# Patient Record
Sex: MALE | Race: OTHER | Employment: OTHER | URBAN - METROPOLITAN AREA
[De-identification: names, ages, dates, MRNs, and addresses within clinical notes are randomized per-mention and may not be internally consistent; named-entity substitution may affect disease eponyms.]

---

## 2017-01-21 ENCOUNTER — HOSPITAL ENCOUNTER (OUTPATIENT)
Age: 82
Discharge: HOME | End: 2017-01-21
Attending: FAMILY MEDICINE
Payer: MEDICARE

## 2017-01-21 VITALS
HEART RATE: 65 BPM | BODY MASS INDEX: 22.9 KG/M2 | RESPIRATION RATE: 16 BRPM | DIASTOLIC BLOOD PRESSURE: 70 MMHG | SYSTOLIC BLOOD PRESSURE: 154 MMHG | WEIGHT: 160 LBS | HEIGHT: 70 IN | TEMPERATURE: 98 F | OXYGEN SATURATION: 96 %

## 2017-01-21 DIAGNOSIS — R31.9 URINARY TRACT INFECTION WITH HEMATURIA, SITE UNSPECIFIED: Primary | ICD-10-CM

## 2017-01-21 DIAGNOSIS — N39.0 URINARY TRACT INFECTION WITH HEMATURIA, SITE UNSPECIFIED: Primary | ICD-10-CM

## 2017-01-21 LAB
URINE CLARITY: CLEAR
URINE COLOR: YELLOW
URINE GLUCOSE: 100 MG/DL
URINE KETONES: NEGATIVE MG/DL
URINE NITRITE: NEGATIVE
URINE PH: 6
URINE PROTEIN: NEGATIVE MG /DL
URINE SPECIFIC GRAVITY: 1.02
URINE UROBILINOGEN: 0.2 MG/DL

## 2017-01-21 PROCEDURE — 99214 OFFICE O/P EST MOD 30 MIN: CPT

## 2017-01-21 PROCEDURE — 87086 URINE CULTURE/COLONY COUNT: CPT | Performed by: FAMILY MEDICINE

## 2017-01-21 RX ORDER — LEVOFLOXACIN 750 MG/1
750 TABLET ORAL DAILY
Qty: 7 TABLET | Refills: 0 | Status: SHIPPED | OUTPATIENT
Start: 2017-01-21 | End: 2017-01-28

## 2017-01-21 NOTE — ED PROVIDER NOTES
Patient Seen in: [facility] In [address] [place] [address]    History   Patient presents with:  Abdominal Pain    Stated Complaint: urination problem    HPI    Patient with reported history of prostatitis and bladder cancer with surgeries done in [facility] Tartrate 25 MG Oral Tab,  Take 25 mg by mouth. Omeprazole 40 MG Oral Capsule Delayed Release,  Take 40 mg by mouth.   sucralfate 1 G Oral Tab,  Take 1 g by mouth. Warfarin Sodium 2.5 MG Oral Tab,  Take 2.5 mg by mouth. No family history on file. following:     Glucose, Urine 100  (*)     Bilirubin, Urine Small (*)     Blood, Urine Moderate (*)     Leukocyte esterase urine Trace (*)     All other components within normal limits       MDM   Extensive conversation had with the patient and his sons wh

## 2017-02-21 ENCOUNTER — OFFICE VISIT (OUTPATIENT)
Dept: SURGERY | Facility: CLINIC | Age: 82
End: 2017-02-21

## 2017-02-21 VITALS
TEMPERATURE: 98 F | HEIGHT: 70 IN | DIASTOLIC BLOOD PRESSURE: 78 MMHG | WEIGHT: 190 LBS | BODY MASS INDEX: 27.2 KG/M2 | SYSTOLIC BLOOD PRESSURE: 163 MMHG | HEART RATE: 59 BPM | RESPIRATION RATE: 16 BRPM

## 2017-02-21 DIAGNOSIS — C67.9 MALIGNANT NEOPLASM OF URINARY BLADDER, UNSPECIFIED SITE (HCC): ICD-10-CM

## 2017-02-21 DIAGNOSIS — N35.011 POST-TRAUMATIC BULBOUS URETHRAL STRICTURE: Primary | ICD-10-CM

## 2017-02-21 DIAGNOSIS — R97.20 ELEVATED PSA: ICD-10-CM

## 2017-02-21 PROCEDURE — 99214 OFFICE O/P EST MOD 30 MIN: CPT | Performed by: UROLOGY

## 2017-02-21 PROCEDURE — G0463 HOSPITAL OUTPT CLINIC VISIT: HCPCS | Performed by: UROLOGY

## 2017-02-21 NOTE — PROGRESS NOTES
Verneta Paget is a 80year old male. HPI:   Patient presents with:  Bladder Cancer: Follow up  Urinary Symptoms (urologic): Painful urination    80year-old male accompanied by his son in follow-up to a previous visit September 23, 2016.   The pa Smoking Status: Former Smoker                   Packs/Day: 0.00  Years:         Alcohol Use: No                 Medications (Active prior to today's visit):    Current Outpatient Prescriptions:  ClonazePAM 0.5 MG Oral Tab Take 1 tablet by mouth nightly as family members that he will need to have preoperative medical clearance. Once obtained he will call us in which case he will be scheduled for cystoscopy under anesthesia incision of bulbar urethral stricture, possible TURBT.   He will need a CBC, BMP, urin

## 2017-03-01 ENCOUNTER — TELEPHONE (OUTPATIENT)
Dept: SURGERY | Facility: CLINIC | Age: 82
End: 2017-03-01

## 2017-03-02 ENCOUNTER — TELEPHONE (OUTPATIENT)
Dept: SURGERY | Facility: CLINIC | Age: 82
End: 2017-03-02

## 2017-03-03 NOTE — TELEPHONE ENCOUNTER
I need to see his clearance note first before scheduling any surgery. I don't see that anything has been scanned in as of yet.   Thanks

## 2017-03-07 NOTE — TELEPHONE ENCOUNTER
Spoke with patient' daughter Rhonda stated there ready to proceed. Informed once you have reviewed chart, I will then be able to schedule procedure. Agreed waiting for call back.     Rambo Revel I will place surgery request form on your desk with patient' chart thanks, Juan Diego Mcgarry

## 2017-03-07 NOTE — TELEPHONE ENCOUNTER
Reviewed notes from [facility]. The patient has been cleared for surgery. He will need to stop his aspirin for 7 days preop and his Coumadin for 4 and will need to contact the primary care physician Wade Gauthier NP for Lovenox prescription. I left the paperwork for Heather Bobby.

## 2017-03-15 ENCOUNTER — HOSPITAL ENCOUNTER (OUTPATIENT)
Facility: HOSPITAL | Age: 82
Setting detail: HOSPITAL OUTPATIENT SURGERY
Discharge: HOME | End: 2017-03-15
Attending: UROLOGY | Admitting: UROLOGY
Payer: MEDICARE

## 2017-03-15 ENCOUNTER — SURGERY (OUTPATIENT)
Age: 82
End: 2017-03-15

## 2017-03-15 ENCOUNTER — ANESTHESIA (OUTPATIENT)
Dept: SURGERY | Facility: HOSPITAL | Age: 82
End: 2017-03-15
Payer: MEDICARE

## 2017-03-15 ENCOUNTER — TELEPHONE (OUTPATIENT)
Dept: SURGERY | Facility: CLINIC | Age: 82
End: 2017-03-15

## 2017-03-15 ENCOUNTER — ANESTHESIA EVENT (OUTPATIENT)
Dept: SURGERY | Facility: HOSPITAL | Age: 82
End: 2017-03-15
Payer: MEDICARE

## 2017-03-15 VITALS
WEIGHT: 194.31 LBS | RESPIRATION RATE: 16 BRPM | BODY MASS INDEX: 26.32 KG/M2 | SYSTOLIC BLOOD PRESSURE: 123 MMHG | TEMPERATURE: 99 F | DIASTOLIC BLOOD PRESSURE: 59 MMHG | HEART RATE: 80 BPM | HEIGHT: 72 IN | OXYGEN SATURATION: 93 %

## 2017-03-15 DIAGNOSIS — N35.919: Primary | ICD-10-CM

## 2017-03-15 LAB
GLUCOSE BLDC GLUCOMTR-MCNC: 104 MG/DL (ref 70–99)
GLUCOSE BLDC GLUCOMTR-MCNC: 109 MG/DL (ref 70–99)
INR BLD: 1.3 (ref 0.9–1.2)
PROTHROMBIN TIME: 15.5 SECONDS (ref 11.8–14.5)

## 2017-03-15 PROCEDURE — 52276 CYSTOSCOPY AND TREATMENT: CPT | Performed by: UROLOGY

## 2017-03-15 PROCEDURE — 0TND8ZZ RELEASE URETHRA, VIA NATURAL OR ARTIFICIAL OPENING ENDOSCOPIC: ICD-10-PCS | Performed by: UROLOGY

## 2017-03-15 PROCEDURE — 52224 CYSTOSCOPY AND TREATMENT: CPT | Performed by: UROLOGY

## 2017-03-15 PROCEDURE — 0TBD8ZX EXCISION OF URETHRA, VIA NATURAL OR ARTIFICIAL OPENING ENDOSCOPIC, DIAGNOSTIC: ICD-10-PCS | Performed by: UROLOGY

## 2017-03-15 RX ORDER — LIDOCAINE HYDROCHLORIDE 20 MG/ML
JELLY TOPICAL AS NEEDED
Status: DISCONTINUED | OUTPATIENT
Start: 2017-03-15 | End: 2017-03-15 | Stop reason: HOSPADM

## 2017-03-15 RX ORDER — ONDANSETRON 2 MG/ML
INJECTION INTRAMUSCULAR; INTRAVENOUS AS NEEDED
Status: DISCONTINUED | OUTPATIENT
Start: 2017-03-15 | End: 2017-03-15 | Stop reason: SURG

## 2017-03-15 RX ORDER — PHENAZOPYRIDINE HYDROCHLORIDE 200 MG/1
200 TABLET, FILM COATED ORAL 3 TIMES DAILY PRN
Qty: 10 TABLET | Refills: 0 | Status: SHIPPED | OUTPATIENT
Start: 2017-03-15 | End: 2019-07-12

## 2017-03-15 RX ORDER — MORPHINE SULFATE 2 MG/ML
2 INJECTION, SOLUTION INTRAMUSCULAR; INTRAVENOUS EVERY 10 MIN PRN
Status: DISCONTINUED | OUTPATIENT
Start: 2017-03-15 | End: 2017-03-15

## 2017-03-15 RX ORDER — MORPHINE SULFATE 4 MG/ML
4 INJECTION, SOLUTION INTRAMUSCULAR; INTRAVENOUS EVERY 10 MIN PRN
Status: DISCONTINUED | OUTPATIENT
Start: 2017-03-15 | End: 2017-03-15

## 2017-03-15 RX ORDER — HYDROMORPHONE HYDROCHLORIDE 1 MG/ML
0.2 INJECTION, SOLUTION INTRAMUSCULAR; INTRAVENOUS; SUBCUTANEOUS EVERY 5 MIN PRN
Status: DISCONTINUED | OUTPATIENT
Start: 2017-03-15 | End: 2017-03-15

## 2017-03-15 RX ORDER — MORPHINE SULFATE 10 MG/ML
6 INJECTION, SOLUTION INTRAMUSCULAR; INTRAVENOUS EVERY 10 MIN PRN
Status: DISCONTINUED | OUTPATIENT
Start: 2017-03-15 | End: 2017-03-15

## 2017-03-15 RX ORDER — ONDANSETRON 2 MG/ML
4 INJECTION INTRAMUSCULAR; INTRAVENOUS ONCE AS NEEDED
Status: DISCONTINUED | OUTPATIENT
Start: 2017-03-15 | End: 2017-03-15

## 2017-03-15 RX ORDER — SODIUM CHLORIDE, SODIUM LACTATE, POTASSIUM CHLORIDE, CALCIUM CHLORIDE 600; 310; 30; 20 MG/100ML; MG/100ML; MG/100ML; MG/100ML
INJECTION, SOLUTION INTRAVENOUS CONTINUOUS PRN
Status: DISCONTINUED | OUTPATIENT
Start: 2017-03-15 | End: 2017-03-15 | Stop reason: SURG

## 2017-03-15 RX ORDER — NALOXONE HYDROCHLORIDE 0.4 MG/ML
80 INJECTION, SOLUTION INTRAMUSCULAR; INTRAVENOUS; SUBCUTANEOUS AS NEEDED
Status: DISCONTINUED | OUTPATIENT
Start: 2017-03-15 | End: 2017-03-15

## 2017-03-15 RX ORDER — HYDROCODONE BITARTRATE AND ACETAMINOPHEN 5; 325 MG/1; MG/1
2 TABLET ORAL AS NEEDED
Status: DISCONTINUED | OUTPATIENT
Start: 2017-03-15 | End: 2017-03-15

## 2017-03-15 RX ORDER — PHENYLEPHRINE HCL 10 MG/ML
VIAL (ML) INJECTION AS NEEDED
Status: DISCONTINUED | OUTPATIENT
Start: 2017-03-15 | End: 2017-03-15 | Stop reason: SURG

## 2017-03-15 RX ORDER — HYDROCODONE BITARTRATE AND ACETAMINOPHEN 5; 325 MG/1; MG/1
1 TABLET ORAL AS NEEDED
Status: DISCONTINUED | OUTPATIENT
Start: 2017-03-15 | End: 2017-03-15

## 2017-03-15 RX ORDER — SODIUM CHLORIDE, SODIUM LACTATE, POTASSIUM CHLORIDE, CALCIUM CHLORIDE 600; 310; 30; 20 MG/100ML; MG/100ML; MG/100ML; MG/100ML
INJECTION, SOLUTION INTRAVENOUS CONTINUOUS
Status: DISCONTINUED | OUTPATIENT
Start: 2017-03-15 | End: 2017-03-15

## 2017-03-15 RX ORDER — LIDOCAINE HYDROCHLORIDE 10 MG/ML
INJECTION, SOLUTION EPIDURAL; INFILTRATION; INTRACAUDAL; PERINEURAL AS NEEDED
Status: DISCONTINUED | OUTPATIENT
Start: 2017-03-15 | End: 2017-03-15 | Stop reason: SURG

## 2017-03-15 RX ORDER — ACETAMINOPHEN 325 MG/1
650 TABLET ORAL ONCE
Status: COMPLETED | OUTPATIENT
Start: 2017-03-15 | End: 2017-03-15

## 2017-03-15 RX ORDER — ENOXAPARIN SODIUM 150 MG/ML
INJECTION SUBCUTANEOUS EVERY 12 HOURS
COMMUNITY
End: 2017-09-19 | Stop reason: ALTCHOICE

## 2017-03-15 RX ORDER — HYDROMORPHONE HYDROCHLORIDE 1 MG/ML
0.4 INJECTION, SOLUTION INTRAMUSCULAR; INTRAVENOUS; SUBCUTANEOUS EVERY 5 MIN PRN
Status: DISCONTINUED | OUTPATIENT
Start: 2017-03-15 | End: 2017-03-15

## 2017-03-15 RX ORDER — HYDROMORPHONE HYDROCHLORIDE 1 MG/ML
0.6 INJECTION, SOLUTION INTRAMUSCULAR; INTRAVENOUS; SUBCUTANEOUS EVERY 5 MIN PRN
Status: DISCONTINUED | OUTPATIENT
Start: 2017-03-15 | End: 2017-03-15

## 2017-03-15 RX ORDER — PHENAZOPYRIDINE HYDROCHLORIDE 200 MG/1
200 TABLET, FILM COATED ORAL ONCE
Status: COMPLETED | OUTPATIENT
Start: 2017-03-15 | End: 2017-03-15

## 2017-03-15 RX ORDER — FAMOTIDINE 20 MG/1
20 TABLET ORAL ONCE
Status: DISCONTINUED | OUTPATIENT
Start: 2017-03-15 | End: 2017-03-15 | Stop reason: HOSPADM

## 2017-03-15 RX ORDER — HALOPERIDOL 5 MG/ML
0.25 INJECTION INTRAMUSCULAR ONCE AS NEEDED
Status: DISCONTINUED | OUTPATIENT
Start: 2017-03-15 | End: 2017-03-15

## 2017-03-15 RX ORDER — EPHEDRINE SULFATE 50 MG/ML
INJECTION, SOLUTION INTRAVENOUS AS NEEDED
Status: DISCONTINUED | OUTPATIENT
Start: 2017-03-15 | End: 2017-03-15 | Stop reason: SURG

## 2017-03-15 RX ORDER — LEVOFLOXACIN 5 MG/ML
500 INJECTION, SOLUTION INTRAVENOUS ONCE
Status: COMPLETED | OUTPATIENT
Start: 2017-03-15 | End: 2017-03-15

## 2017-03-15 RX ORDER — CEFADROXIL 500 MG/1
500 CAPSULE ORAL 2 TIMES DAILY
Qty: 6 CAPSULE | Refills: 0 | Status: SHIPPED | OUTPATIENT
Start: 2017-03-16 | End: 2017-03-19

## 2017-03-15 RX ADMIN — ONDANSETRON 4 MG: 2 INJECTION INTRAMUSCULAR; INTRAVENOUS at 11:07:00

## 2017-03-15 RX ADMIN — SODIUM CHLORIDE, SODIUM LACTATE, POTASSIUM CHLORIDE, CALCIUM CHLORIDE: 600; 310; 30; 20 INJECTION, SOLUTION INTRAVENOUS at 11:07:00

## 2017-03-15 RX ADMIN — EPHEDRINE SULFATE 5 MG: 50 INJECTION, SOLUTION INTRAVENOUS at 10:49:00

## 2017-03-15 RX ADMIN — LIDOCAINE HYDROCHLORIDE 50 MG: 10 INJECTION, SOLUTION EPIDURAL; INFILTRATION; INTRACAUDAL; PERINEURAL at 10:35:00

## 2017-03-15 RX ADMIN — SODIUM CHLORIDE, SODIUM LACTATE, POTASSIUM CHLORIDE, CALCIUM CHLORIDE: 600; 310; 30; 20 INJECTION, SOLUTION INTRAVENOUS at 10:29:00

## 2017-03-15 RX ADMIN — PHENYLEPHRINE HCL 100 MCG: 10 MG/ML VIAL (ML) INJECTION at 11:02:00

## 2017-03-15 NOTE — OR NURSING
Per patient and family request patient sent home with large bag active. Leg bag sent home with patient and son Tutu Guillermo. Both verbalized understanding of luther bag changes.

## 2017-03-15 NOTE — TELEPHONE ENCOUNTER
Staff this patient needs to see me in 1 week for Wylie catheter removal.  Please call him Thursday or Friday this week to schedule.

## 2017-03-15 NOTE — H&P
80-year-old male accompanied by his son in follow-up to a previous visit September 23, 2016.  The patient has a previous urologic history of bladder cancer diagnosed while he was in [facility] and treated with cystoscopy and resection in 2012 according to the (Active prior to today's visit):    Current Outpatient Prescriptions:  ClonazePAM 0.5 MG Oral Tab  Take 1 tablet by mouth nightly as needed.  Disp:   Rfl: 3    gabapentin 300 MG Oral Cap  Take 1 capsule by mouth 2 (two) times daily.  Disp:   Rfl: 3    Insu to the patient and his family members that he will need to have preoperative medical clearance.  Once obtained he will call us in which case he will be scheduled for cystoscopy under anesthesia incision of bulbar urethral stricture, possible TURBT.  He faiza

## 2017-03-15 NOTE — OPERATIVE REPORT
[facility]OSP - [facility]    Operative Note     Vesta Low Location: OR   CSN [id number] MRN [id number]   Admission Date 3/15/2017 Operation Date 3/15/2017   Attending Physician Irene Smith MD Operating Physician Darleen Kanner, MD      Preop able to place the urethrotome set into the bladder. Prior to incising the stricture I had placed a 0.38 Bentson wire under fluoroscopy into the urinary bladder. This was placed for safety purposes.   Once the cold knife urethrotome was placed and dilated

## 2017-03-15 NOTE — INTERVAL H&P NOTE
Pre-op Diagnosis: Urethral stricture     The above referenced H&P was reviewed by Zenaida Neely MD on 3/15/2017, the patient was examined and no significant changes have occurred in the patient's condition since the H&P was performed.   I discussed with saturnino

## 2017-03-15 NOTE — ANESTHESIA PREPROCEDURE EVALUATION
Anesthesia PreOp Note    HPI:     Dee Thomas is a 80year old male who presents for preoperative consultation requested by:  Regino Giles MD    Date of Surgery: 3/15/2017    Procedure(s):  CYSTOSCOPY  LASER HOLMIUM LITHOTRIPSY  CYSTOSCOPY MEDRANO 25 mg by mouth. Disp:  Rfl:  3/15/2017 at 0700   Omeprazole 40 MG Oral Capsule Delayed Release Take 40 mg by mouth. Disp:  Rfl:  3/15/2017 at 0700   Warfarin Sodium 2.5 MG Oral Tab Take 2.5 mg by mouth.  Disp:  Rfl:  3/10/2017   Insulin Syringe 31G X 5/16\" height is 1.829 m (6') and weight is 88.14 kg (194 lb 5 oz). His oral temperature is 97.7 °F (36.5 °C). His blood pressure is 145/73 and his pulse is 70. His respiration is 16 and oxygen saturation is 94%.     03/14/17  1223 03/15/17  0906   BP:  145/73   P

## 2017-03-15 NOTE — ANESTHESIA POSTPROCEDURE EVALUATION
Patient: Donny Glass    Procedure Summary     Date Anesthesia Start Anesthesia Stop Room / Location    03/15/17 1029 1124 300 [facility] MAIN OR 14 / 300 [facility] MAIN OR       Procedure Diagnosis Surgeon Responsible Provider    CYSTOSCOPY (N/A ); LASER HOLMIUM LITH

## 2017-03-16 NOTE — TELEPHONE ENCOUNTER
I attempted to reach pt at the only # listed and had to Floy Seats. I held an appt for thurs 3/23 at 10:30am. Please confirm when pt calls back and if he has questions please put the call thru to the RN's.

## 2017-03-23 ENCOUNTER — OFFICE VISIT (OUTPATIENT)
Dept: SURGERY | Facility: CLINIC | Age: 82
End: 2017-03-23

## 2017-03-23 VITALS
BODY MASS INDEX: 27.2 KG/M2 | HEIGHT: 70 IN | HEART RATE: 80 BPM | SYSTOLIC BLOOD PRESSURE: 138 MMHG | RESPIRATION RATE: 16 BRPM | DIASTOLIC BLOOD PRESSURE: 75 MMHG | TEMPERATURE: 98 F | WEIGHT: 190 LBS

## 2017-03-23 DIAGNOSIS — N35.011 POST-TRAUMATIC BULBOUS URETHRAL STRICTURE: Primary | ICD-10-CM

## 2017-03-23 DIAGNOSIS — C61 PROSTATE CANCER (HCC): ICD-10-CM

## 2017-03-23 DIAGNOSIS — R97.20 ELEVATED PSA: ICD-10-CM

## 2017-03-23 PROCEDURE — 99214 OFFICE O/P EST MOD 30 MIN: CPT | Performed by: UROLOGY

## 2017-03-23 PROCEDURE — G0463 HOSPITAL OUTPT CLINIC VISIT: HCPCS | Performed by: UROLOGY

## 2017-03-23 RX ORDER — BICALUTAMIDE 50 MG/1
50 TABLET, FILM COATED ORAL NIGHTLY
Qty: 14 TABLET | Refills: 0 | Status: SHIPPED | OUTPATIENT
Start: 2017-03-23 | End: 2019-07-12

## 2017-03-23 NOTE — PROGRESS NOTES
Carie Snare is a 80year old male.     HPI:   Patient presents with:  Bladder Cancer: luther removal today, used client ID # [id number]      80year old male here with his son in followup to a previous cystoscopy, incision of bulbar urethral st 0.3 ML Does not apply Misc U UTD BID Disp:  Rfl: 3   aspirin EC 81 MG Oral Tab EC Take 81 mg by mouth. Disp:  Rfl:    Atorvastatin Calcium 10 MG Oral Tab Take 10 mg by mouth. Disp:  Rfl:    Glucose Blood In Vitro Strip 1 strip.  Disp:  Rfl:    FLUoxetine HC simply palliative. They will followup accordingly. I spent a total of 25 minutes with patient more than half the time in face to face discussion. Orders This Visit:  No orders of the defined types were placed in this encounter.        Meds This Vi

## 2017-03-24 ENCOUNTER — TELEPHONE (OUTPATIENT)
Dept: SURGERY | Facility: CLINIC | Age: 82
End: 2017-03-24

## 2017-03-28 ENCOUNTER — HOSPITAL ENCOUNTER (OUTPATIENT)
Dept: NUCLEAR MEDICINE | Facility: HOSPITAL | Age: 82
Discharge: HOME | End: 2017-03-28
Attending: UROLOGY
Payer: MEDICARE

## 2017-03-28 ENCOUNTER — HOSPITAL ENCOUNTER (OUTPATIENT)
Dept: CT IMAGING | Facility: HOSPITAL | Age: 82
Discharge: HOME | End: 2017-03-28
Attending: UROLOGY
Payer: MEDICARE

## 2017-03-28 DIAGNOSIS — C61 PROSTATE CANCER (HCC): ICD-10-CM

## 2017-03-28 PROCEDURE — 82565 ASSAY OF CREATININE: CPT

## 2017-03-28 PROCEDURE — 74177 CT ABD & PELVIS W/CONTRAST: CPT

## 2017-03-28 PROCEDURE — 78306 BONE IMAGING WHOLE BODY: CPT

## 2017-03-28 NOTE — TELEPHONE ENCOUNTER
Pt had fov already. Unable to locate  fyi or R. O.I. In epic, stating ok to release info to daughter. fov scheduled for 4/06th.

## 2017-04-01 ENCOUNTER — APPOINTMENT (OUTPATIENT)
Dept: RADIATION ONCOLOGY | Facility: HOSPITAL | Age: 82
End: 2017-04-01
Attending: RADIOLOGY
Payer: MEDICARE

## 2017-04-04 NOTE — TELEPHONE ENCOUNTER
Dr. Aminata Pruitt pt [address] [place] [address] 3/23/17 and pt pharmacy requesting a refill on bicalutamide, if you agree please review and sign med, I copied and pasted part of your last note below. ..     I will start him on Bicalutamide 50 mg PO QD (side effects reviewed) in anticipat

## 2017-04-06 ENCOUNTER — OFFICE VISIT (OUTPATIENT)
Dept: SURGERY | Facility: CLINIC | Age: 82
End: 2017-04-06

## 2017-04-06 VITALS
RESPIRATION RATE: 16 BRPM | HEART RATE: 71 BPM | BODY MASS INDEX: 27.09 KG/M2 | HEIGHT: 72 IN | TEMPERATURE: 99 F | DIASTOLIC BLOOD PRESSURE: 79 MMHG | WEIGHT: 200 LBS | SYSTOLIC BLOOD PRESSURE: 146 MMHG

## 2017-04-06 DIAGNOSIS — C61 PROSTATE CANCER (HCC): Primary | ICD-10-CM

## 2017-04-06 DIAGNOSIS — N35.011 POST-TRAUMATIC BULBOUS URETHRAL STRICTURE: ICD-10-CM

## 2017-04-06 DIAGNOSIS — R82.90 URINE FINDING: ICD-10-CM

## 2017-04-06 PROCEDURE — 81003 URINALYSIS AUTO W/O SCOPE: CPT | Performed by: UROLOGY

## 2017-04-06 PROCEDURE — 96372 THER/PROPH/DIAG INJ SC/IM: CPT | Performed by: UROLOGY

## 2017-04-06 PROCEDURE — G0463 HOSPITAL OUTPT CLINIC VISIT: HCPCS | Performed by: UROLOGY

## 2017-04-06 PROCEDURE — 99214 OFFICE O/P EST MOD 30 MIN: CPT | Performed by: UROLOGY

## 2017-04-06 NOTE — PROGRESS NOTES
Chang Huynh is a 80year old male. HPI:   Patient presents with: Follow - Up: Patient states that he is improving. Patient states that he minor burning sensation at end of urination. Patient c/o diarrhea.  Denies bloody stoos, fever, chills, a Family History   Problem Relation Age of Onset   • Hypertension Father    • Diabetes Father    • Hypertension Mother    • Diabetes Mother    • Cancer Mother    • Cancer Sister       Social History:   Smoking Status: Former Smoker                   Packs/ 25 MG Oral Tab Take 25 mg by mouth. Disp:  Rfl:    Omeprazole 40 MG Oral Capsule Delayed Release Take 40 mg by mouth. Disp:  Rfl:    sucralfate 1 G Oral Tab Take 1 g by mouth. Disp:  Rfl:    Warfarin Sodium 2.5 MG Oral Tab Take 2.5 mg by mouth.  Disp:  Rfl:

## 2017-04-11 RX ORDER — BICALUTAMIDE 50 MG/1
TABLET, FILM COATED ORAL
Refills: 0 | OUTPATIENT
Start: 2017-04-11

## 2017-04-14 ENCOUNTER — OFFICE VISIT (OUTPATIENT)
Dept: RADIATION ONCOLOGY | Facility: HOSPITAL | Age: 82
End: 2017-04-14
Attending: RADIOLOGY
Payer: MEDICARE

## 2017-04-14 ENCOUNTER — OFFICE VISIT (OUTPATIENT)
Dept: HEMATOLOGY/ONCOLOGY | Facility: HOSPITAL | Age: 82
End: 2017-04-14
Attending: INTERNAL MEDICINE
Payer: MEDICARE

## 2017-04-14 VITALS
DIASTOLIC BLOOD PRESSURE: 68 MMHG | HEART RATE: 68 BPM | HEIGHT: 72 IN | SYSTOLIC BLOOD PRESSURE: 158 MMHG | TEMPERATURE: 99 F | WEIGHT: 195 LBS | BODY MASS INDEX: 26.41 KG/M2

## 2017-04-14 DIAGNOSIS — C61 MALIGNANT NEOPLASM OF PROSTATE (HCC): Primary | ICD-10-CM

## 2017-04-14 PROCEDURE — 99212 OFFICE O/P EST SF 10 MIN: CPT

## 2017-04-14 PROCEDURE — 99205 OFFICE O/P NEW HI 60 MIN: CPT | Performed by: INTERNAL MEDICINE

## 2017-04-14 PROCEDURE — G0463 HOSPITAL OUTPT CLINIC VISIT: HCPCS | Performed by: INTERNAL MEDICINE

## 2017-04-14 NOTE — PROGRESS NOTES
Safety Plan Of Care:    Safety Problem:  Risk of fall    Related to:    Diminished physical activity  Pt has a Prosthetic L leg below the knee    General Safety Interventions:Provide an escort while in the dept.        Expected Outcomes:  No injury, trauma

## 2017-04-14 NOTE — CONSULTS
[facility]    PATIENT'S NAME: Bibiana Tompkins   RADIATION ONCOLOGIST: Moy Santana MD   PATIENT ACCOUNT #: [de-identified] LOCATION: [address] [place] [address] RECORD #: [id number] YOB: 1935   CONSULTATION DATE: 04/14/2017 possibility for definitive treatment. The patient otherwise is doing reasonably well. He continues to have some urinary frequency, but this has improved ever since his procedure. He denies any blood in his urine or stool.   He has no changes in appetit nondistended, with normoactive bowel sounds and no hepatosplenomegaly. EXTREMITIES:  Without clubbing, cyanosis, or edema. The extremity exam is significant for the fact that the patient is status post a left BKA.   NEUROLOGIC:  Cranial nerves II through there has been proven benefit to giving Taxol-based chemotherapy to these high-risk patients. The patient has seen Dr. Fernando Cosme, who does have some reservations given the patient's age and overall health status.   This determination does not need to be made no which he will go back to see Dr. Kelly Glynn again to talk about the possibility of adjuvant chemotherapy. Thank you very much for allowing me the opportunity to participate in the care of this patient.   If there are questions regarding the radiotherapy, ple

## 2017-04-14 NOTE — PROGRESS NOTES
GI Plan Of Care:    Problem:    Diarrhea    Problems related to:    Radiation therapy    Interventions:  Monitor bowel function  Administer antidiarrheals  Instruct patient/family regarding medications  Encourage fluids  Instruct patient/family on low fat/

## 2017-04-14 NOTE — PROGRESS NOTES
Nursing Consultation Note  Patient: Marybeth Palomares  YOB: 1935  Age: 80year old  Radiation Oncologist: Dr. Obinna Patel  Referring Physician: No ref. provider found  Diagnosis:No diagnosis found.   Consult Date: 4/14/2017       Take 81 mg by mouth. Disp:  Rfl:    Atorvastatin Calcium 10 MG Oral Tab Take 10 mg by mouth. Disp:  Rfl:    Glucose Blood In Vitro Strip 1 strip. Disp:  Rfl:    FLUoxetine HCl 10 MG Oral Cap Take 10 mg by mouth.  Disp:  Rfl:    Insulin Syringe 30G X 5/16\" History    AMPUTATION LOW LEG,CIRCULAR Left     Comment below the knee amputation    CATH PERIPHERAL STENT Right     Comment lower leg         Social History   Marital Status: Single  Spouse Name: N/A    Years of Education: N/A  Number of Children: N/A

## 2017-04-14 NOTE — PROGRESS NOTES
Knowledge Deficit Plan Of Care:    Problem:  Knowledge Deficit    Problems related to:    Radiation therapy    Interventions:  Assess patient knowledge level  Assess knowledge needs  Instruct on the disease process  Instruct on treatment planning  Instruct

## 2017-04-14 NOTE — CONSULTS
[facility]    PATIENT'S NAME: Skinny DE LEON E [facility] PHYSICIAN: Job Forrest MD   PATIENT ACCOUNT #: [de-identified] LOCATION: [address] [place] [address] RECORD #: [id number] YOB: 1935   CONSULTATION DATE: 04/14/2017       [facility] setting of gangrene, hypertension, gastroesophageal reflux disease, hyperlipidemia. SOCIAL HISTORY:  The patient denies any alcohol, tobacco, or illicit drug use. He states he is a never smoker.   He is from [facility] and previously worked various jobs d Oncology today with Dr. Franchesca Salinas. We agree with the plan for androgen deprivation therapy with external beam radiation therapy, continuing androgen deprivation therapy for a minimum of 2 to 3 years.     We also discussed the use of upfront chemothera

## 2017-04-14 NOTE — PROGRESS NOTES
Plan Of Care:    Problem:  Alteration in elimination    Problems related to:    Side effects of radiation therapy    Interventions:  Encourage fluids  Limit PO intake in the evening  Medications as prescribed  Assess AUA score    Expected Outcomes:  Imp

## 2017-04-14 NOTE — PROGRESS NOTES
Primary language:  Japanese  Language line required? yes  Comprehension Ability:  good  Able to read?  yes  Able to write? yes  Communication tools:  Language line  Patient's ability to learn:  good  Readiness to learn:   Motivated  Learning preferences:

## 2017-05-01 ENCOUNTER — APPOINTMENT (OUTPATIENT)
Dept: RADIATION ONCOLOGY | Facility: HOSPITAL | Age: 82
End: 2017-05-01
Attending: RADIOLOGY
Payer: MEDICARE

## 2017-05-02 ENCOUNTER — TELEPHONE (OUTPATIENT)
Dept: SURGERY | Facility: CLINIC | Age: 82
End: 2017-05-02

## 2017-05-02 ENCOUNTER — TELEPHONE (OUTPATIENT)
Dept: RADIATION ONCOLOGY | Facility: HOSPITAL | Age: 82
End: 2017-05-02

## 2017-05-02 NOTE — TELEPHONE ENCOUNTER
[facility] from Dr. Chino Holloway office called. Patient needs appt for  fiducial markers,  Please call [facility]. Thank you.

## 2017-05-02 NOTE — TELEPHONE ENCOUNTER
L/M regarding patient' office visit with Sharla Sierra was scheduled at 9:45, needs to move to 12:20

## 2017-05-03 ENCOUNTER — APPOINTMENT (OUTPATIENT)
Dept: LAB | Facility: HOSPITAL | Age: 82
End: 2017-05-03
Attending: UROLOGY
Payer: MEDICARE

## 2017-05-03 DIAGNOSIS — C61 PROSTATE CANCER (HCC): ICD-10-CM

## 2017-05-03 PROCEDURE — 36415 COLL VENOUS BLD VENIPUNCTURE: CPT

## 2017-05-03 PROCEDURE — 84153 ASSAY OF PSA TOTAL: CPT

## 2017-05-05 ENCOUNTER — OFFICE VISIT (OUTPATIENT)
Dept: SURGERY | Facility: CLINIC | Age: 82
End: 2017-05-05

## 2017-05-05 VITALS
BODY MASS INDEX: 26 KG/M2 | SYSTOLIC BLOOD PRESSURE: 171 MMHG | RESPIRATION RATE: 16 BRPM | TEMPERATURE: 98 F | DIASTOLIC BLOOD PRESSURE: 80 MMHG | HEART RATE: 74 BPM | WEIGHT: 195 LBS

## 2017-05-05 DIAGNOSIS — N35.011 POST-TRAUMATIC BULBOUS URETHRAL STRICTURE: ICD-10-CM

## 2017-05-05 DIAGNOSIS — C61 PROSTATE CANCER (HCC): Primary | ICD-10-CM

## 2017-05-05 PROCEDURE — 99214 OFFICE O/P EST MOD 30 MIN: CPT | Performed by: UROLOGY

## 2017-05-05 PROCEDURE — G0463 HOSPITAL OUTPT CLINIC VISIT: HCPCS | Performed by: UROLOGY

## 2017-05-05 RX ORDER — CEFDINIR 300 MG/1
300 CAPSULE ORAL EVERY 12 HOURS
Qty: 6 CAPSULE | Refills: 0 | Status: SHIPPED | OUTPATIENT
Start: 2017-05-05 | End: 2017-08-18 | Stop reason: ALTCHOICE

## 2017-05-05 RX ORDER — CIPROFLOXACIN 500 MG/1
500 TABLET, FILM COATED ORAL 2 TIMES DAILY
Qty: 6 TABLET | Refills: 0 | Status: SHIPPED | OUTPATIENT
Start: 2017-05-05 | End: 2017-05-19 | Stop reason: ALTCHOICE

## 2017-05-05 NOTE — PROGRESS NOTES
Berniece Spurling is a 80year old male.     HPI:   Patient presents with:  Prostate Cancer    70-year-old male with very high risk prostate cancer [place] 5+5 diagnosed on a cystoscopy and transurethral biopsy of a prostatic urethral mass most recently 50 MG Oral Tab Take 1 tablet (50 mg total) by mouth nightly. Disp: 14 tablet Rfl: 0   Enoxaparin Sodium 150 MG/ML Subcutaneous Solution Inject into the skin every 12 (twelve) hours.  Disp:  Rfl:    Phenazopyridine HCl (PYRIDIUM) 200 MG Oral Tab Take 1 table urethral stricture    Nursing staff scheduled the patient for a transrectal ultrasound and fiducial marker placements in 2 weeks.   He will contact his primary care physician to get clearance about stopping his Coumadin and aspirin at least 5 days prior to

## 2017-05-08 NOTE — TELEPHONE ENCOUNTER
Please see Last office visit on 5/5 for plan. Patient is scheduled for gold marker placement on 6/1.      ASSESSMENT/PLAN:    Assessment  Prostate cancer (hcc)  (primary encounter diagnosis)  Post-traumatic bulbous urethral stricture    Nursing staff schedu

## 2017-05-19 ENCOUNTER — OFFICE VISIT (OUTPATIENT)
Dept: HEMATOLOGY/ONCOLOGY | Facility: HOSPITAL | Age: 82
End: 2017-05-19
Attending: INTERNAL MEDICINE
Payer: MEDICARE

## 2017-05-19 VITALS
TEMPERATURE: 98 F | RESPIRATION RATE: 16 BRPM | SYSTOLIC BLOOD PRESSURE: 158 MMHG | HEIGHT: 72 IN | BODY MASS INDEX: 26.95 KG/M2 | DIASTOLIC BLOOD PRESSURE: 79 MMHG | WEIGHT: 199 LBS | HEART RATE: 81 BPM

## 2017-05-19 DIAGNOSIS — C61 MALIGNANT NEOPLASM OF PROSTATE (HCC): Primary | ICD-10-CM

## 2017-05-19 PROCEDURE — 99214 OFFICE O/P EST MOD 30 MIN: CPT | Performed by: INTERNAL MEDICINE

## 2017-05-19 PROCEDURE — G0463 HOSPITAL OUTPT CLINIC VISIT: HCPCS | Performed by: INTERNAL MEDICINE

## 2017-05-19 NOTE — PROGRESS NOTES
Cancer Center Progress Note    Patient Name: Randy Miller   YOB: 1935   Medical Record Number: [id number]   Attending Physician: Kezia eMad M.D.      Chief Complaint:  Prostate cancer    History of Present Illness:  Cancer history: Comment below the knee amputation    CATH PERIPHERAL STENT Right     Comment lower leg       Family History:  Family History   Problem Relation Age of Onset   • Hypertension Father    • Diabetes Father    • Hypertension Mother    • Diabetes Mother    • 1 ML Does not apply Misc, 1 Syringe., Disp: , Rfl:   •  Insulin NPH, Human,, Isophane, 100 UNIT/ML Subcutaneous Suspension Pen-injector, Inject 3-5 Units into the skin., Disp: , Rfl:   •  Insulin NPH, Human,, Isophane, 100 UNIT/ML Subcutaneous Suspension, insulin-dependent diabetes mellitus, hypertension, hyperlipidemia, being evaluated for clinical localized high-risk/very high-risk prostate cancer.  The patient has Garland 5 + 5 disease, and a PSA of 25.     –Already on androgen deprivation therapy with L

## 2017-05-26 ENCOUNTER — TELEPHONE (OUTPATIENT)
Dept: SURGERY | Facility: CLINIC | Age: 82
End: 2017-05-26

## 2017-05-26 DIAGNOSIS — Z79.01 LONG TERM (CURRENT) USE OF ANTICOAGULANTS: Primary | ICD-10-CM

## 2017-05-26 NOTE — TELEPHONE ENCOUNTER
ASSESSMENT/PLAN:    Assessment  Prostate cancer (hcc)  (primary encounter diagnosis)  Post-traumatic bulbous urethral stricture    Nursing staff scheduled the patient for a transrectal ultrasound and fiducial marker placements in 2 weeks.  He will contact

## 2017-05-26 NOTE — TELEPHONE ENCOUNTER
I received a call from [facility], the nurse at Dr. Fan Morales office at [facility], and she informed that the NP, Stephon Kramer saw the pt yesterday and Dr. Odalys Molina agreed that pt can safely stop the Coumadin and Aspirin for 5 days prior and she will fa

## 2017-05-26 NOTE — TELEPHONE ENCOUNTER
I called the [employer] and spoke with the kenya Herrera and she ran the 2 abx thru the INS and they both went thru and she will fill them both. I called pt's dtr. Whitney Adkins back to inform her of this.

## 2017-05-26 NOTE — TELEPHONE ENCOUNTER
Daughter states the antibiotics pt needs to take before procedure next week are not at pharm - pls send   Also has ques re  INR morning of procedure for 6/1

## 2017-05-30 NOTE — TELEPHONE ENCOUNTER
I spoke with pt's dtr. Mike Farley and informed her that [facility] ROBERTA HURTADO gave the order that pt does need to have the PT/INR drawn the morning of the gold markers placement.  I told her that I pl;aced that order STAT in the system and she should have pt come into the lab f

## 2017-06-01 ENCOUNTER — TELEPHONE (OUTPATIENT)
Dept: SURGERY | Facility: CLINIC | Age: 82
End: 2017-06-01

## 2017-06-01 NOTE — TELEPHONE ENCOUNTER
Patient's daughter had to cancel procedure due to his insurance changing. Insurance changed to [facility] and is out of network. Procedure was scheduled for today 06/01/17. Please advise.  Thank you

## 2017-06-02 NOTE — TELEPHONE ENCOUNTER
Patient should contact insurance to see which MD is in network to complete his care. Routed to NICOLASA.

## 2017-06-07 ENCOUNTER — TELEPHONE (OUTPATIENT)
Dept: RADIATION ONCOLOGY | Facility: HOSPITAL | Age: 82
End: 2017-06-07

## 2017-06-07 NOTE — TELEPHONE ENCOUNTER
Called daughter, Bella Casiano, inquiring about pt's status, and the reason for cancelling the fiducial placement. States pt's insurance was changed to [place], and [address] [place] [address] and Dr Beatriz Andrews are not in network.  States she now has to find new Dr's to treat her father's

## 2017-06-13 ENCOUNTER — TELEPHONE (OUTPATIENT)
Dept: SURGERY | Facility: CLINIC | Age: 82
End: 2017-06-13

## 2017-07-06 ENCOUNTER — TELEPHONE (OUTPATIENT)
Dept: SURGERY | Facility: CLINIC | Age: 82
End: 2017-07-06

## 2017-07-06 NOTE — TELEPHONE ENCOUNTER
Pts daughter states pt ins is now in network, asking if she can reschedule marker placement or if pt needs to come in for OV?

## 2017-07-21 ENCOUNTER — TELEPHONE (OUTPATIENT)
Dept: SURGERY | Facility: CLINIC | Age: 82
End: 2017-07-21

## 2017-07-21 NOTE — TELEPHONE ENCOUNTER
NICOLASA put a call thru from pt's dtr Jaqueline Story who is calling to schd. Gold markers placement. Pt had to cxl that last appt d/t ins purposes and now has medicare and medicaid and wants to reschd.  I gave an new appt for 8/23 at 3:30pm and I asked her if pt took

## 2017-08-18 ENCOUNTER — TELEPHONE (OUTPATIENT)
Dept: SURGERY | Facility: CLINIC | Age: 82
End: 2017-08-18

## 2017-08-18 DIAGNOSIS — C61 PROSTATE CANCER (HCC): Primary | ICD-10-CM

## 2017-08-18 RX ORDER — CEFDINIR 300 MG/1
CAPSULE ORAL
Qty: 3 CAPSULE | Refills: 0 | Status: SHIPPED | OUTPATIENT
Start: 2017-08-18 | End: 2017-11-30

## 2017-08-18 RX ORDER — CIPROFLOXACIN 500 MG/1
TABLET, FILM COATED ORAL
Qty: 3 TABLET | Refills: 0 | Status: SHIPPED | OUTPATIENT
Start: 2017-08-18 | End: 2017-11-30

## 2017-08-18 NOTE — TELEPHONE ENCOUNTER
I spoke with pt's dtrSteff Conley who has a signed ZAHIDA on file as she was calling about the extra ABX that pt needs for his upcoming Glod markers procedure on 8/23.  She states that pt has left 3 Cipro and 3 Cefdinir and asked that I send the remaining 3 tabs/

## 2017-08-18 NOTE — TELEPHONE ENCOUNTER
Returned call of pharmacist and spoke with pharmacist, Regis Murphy. Informed her after speaking with nurse Myla Zaragoza, was informed pt has the remaining qty of each abx at home, so orders are correct.  Per nurse Myla Zaragoza, test was cancelled previously so pt has quantity barreto

## 2017-08-23 ENCOUNTER — APPOINTMENT (OUTPATIENT)
Dept: LAB | Facility: HOSPITAL | Age: 82
End: 2017-08-23
Attending: UROLOGY
Payer: MEDICARE

## 2017-08-23 ENCOUNTER — TELEPHONE (OUTPATIENT)
Dept: SURGERY | Facility: CLINIC | Age: 82
End: 2017-08-23

## 2017-08-23 DIAGNOSIS — O22.30 DVT (DEEP VEIN THROMBOSIS) IN PREGNANCY: ICD-10-CM

## 2017-08-23 DIAGNOSIS — Z79.01 ANTICOAGULANT LONG-TERM USE: Primary | ICD-10-CM

## 2017-08-23 DIAGNOSIS — C61 PROSTATE CANCER (HCC): ICD-10-CM

## 2017-08-23 DIAGNOSIS — C61 MALIGNANT NEOPLASM OF PROSTATE (HCC): ICD-10-CM

## 2017-08-23 DIAGNOSIS — Z79.01 LONG TERM CURRENT USE OF ANTICOAGULANT THERAPY: ICD-10-CM

## 2017-08-23 LAB
INR BLD: 1.4 (ref 0.9–1.2)
PROTHROMBIN TIME: 16.8 SECONDS (ref 11.8–14.5)
PSA SERPL-MCNC: 5.4 NG/ML (ref 0–4)

## 2017-08-23 PROCEDURE — 84402 ASSAY OF FREE TESTOSTERONE: CPT

## 2017-08-23 PROCEDURE — 36415 COLL VENOUS BLD VENIPUNCTURE: CPT

## 2017-08-23 PROCEDURE — 84403 ASSAY OF TOTAL TESTOSTERONE: CPT

## 2017-08-23 PROCEDURE — 84153 ASSAY OF PSA TOTAL: CPT

## 2017-08-23 PROCEDURE — 85610 PROTHROMBIN TIME: CPT

## 2017-08-23 NOTE — TELEPHONE ENCOUNTER
I spoke with pt's dtr and she said that this must be an old msg because pt's procedure was cxld for today d/t his INR results. She was thankful for the call though.

## 2017-08-23 NOTE — TELEPHONE ENCOUNTER
Lab called to report stat results; PT; 16.8, INR; 1.4    Please note patient has procedure for Gold Marker placement scheduled today @ 3:30 pm.      Routed to Dr. Sierra Tovar; also paged Dr. Sierra Tovar with stat results.   Spoke to Dr. Sierra Tovar and relayed stat INR re

## 2017-08-24 ENCOUNTER — APPOINTMENT (OUTPATIENT)
Dept: LAB | Facility: HOSPITAL | Age: 82
End: 2017-08-24
Attending: UROLOGY
Payer: MEDICARE

## 2017-08-24 ENCOUNTER — OFFICE VISIT (OUTPATIENT)
Dept: SURGERY | Facility: CLINIC | Age: 82
End: 2017-08-24

## 2017-08-24 VITALS
HEIGHT: 72 IN | BODY MASS INDEX: 26.95 KG/M2 | WEIGHT: 199 LBS | HEART RATE: 77 BPM | RESPIRATION RATE: 18 BRPM | SYSTOLIC BLOOD PRESSURE: 116 MMHG | DIASTOLIC BLOOD PRESSURE: 50 MMHG | TEMPERATURE: 99 F

## 2017-08-24 DIAGNOSIS — Z79.01 ANTICOAGULANT LONG-TERM USE: ICD-10-CM

## 2017-08-24 DIAGNOSIS — C61 PROSTATE CANCER (HCC): Primary | ICD-10-CM

## 2017-08-24 LAB
INR BLD: 1.3 (ref 0.9–1.2)
PROTHROMBIN TIME: 16.2 SECONDS (ref 11.8–14.5)

## 2017-08-24 PROCEDURE — 76965 ECHO GUIDANCE RADIOTHERAPY: CPT | Performed by: UROLOGY

## 2017-08-24 PROCEDURE — 85610 PROTHROMBIN TIME: CPT

## 2017-08-24 PROCEDURE — 36415 COLL VENOUS BLD VENIPUNCTURE: CPT

## 2017-08-24 PROCEDURE — 99213 OFFICE O/P EST LOW 20 MIN: CPT | Performed by: UROLOGY

## 2017-08-24 PROCEDURE — 55876 PLACE RT DEVICE/MARKER PROS: CPT | Performed by: UROLOGY

## 2017-08-24 RX ORDER — CIPROFLOXACIN 500 MG/1
500 TABLET, FILM COATED ORAL ONCE
Qty: 1 TABLET | Refills: 0 | Status: SHIPPED | OUTPATIENT
Start: 2017-08-24 | End: 2017-08-24

## 2017-08-24 RX ORDER — CEFDINIR 300 MG/1
300 CAPSULE ORAL ONCE
Qty: 1 CAPSULE | Refills: 0 | Status: SHIPPED | OUTPATIENT
Start: 2017-08-24 | End: 2017-08-24

## 2017-08-25 ENCOUNTER — TELEPHONE (OUTPATIENT)
Dept: HEMATOLOGY/ONCOLOGY | Facility: HOSPITAL | Age: 82
End: 2017-08-25

## 2017-08-25 ENCOUNTER — TELEPHONE (OUTPATIENT)
Dept: RADIATION ONCOLOGY | Facility: HOSPITAL | Age: 82
End: 2017-08-25

## 2017-08-25 ENCOUNTER — TELEPHONE (OUTPATIENT)
Dept: SURGERY | Facility: CLINIC | Age: 82
End: 2017-08-25

## 2017-08-25 ENCOUNTER — NURSE ONLY (OUTPATIENT)
Dept: SURGERY | Facility: CLINIC | Age: 82
End: 2017-08-25

## 2017-08-25 DIAGNOSIS — C61 PROSTATE CANCER (HCC): Primary | ICD-10-CM

## 2017-08-25 LAB
TESTOSTERONE, FREE, S: 3.76 NG/DL
TESTOSTERONE, TOTAL, S: 209 NG/DL

## 2017-08-25 PROCEDURE — 96372 THER/PROPH/DIAG INJ SC/IM: CPT | Performed by: UROLOGY

## 2017-08-25 NOTE — TELEPHONE ENCOUNTER
Gali Hernandez called and said her father had his gold markers place last night. She wants to know the next steps. I provided her the number for central scheduling for the MRI. She wants to know does she schedule the test right away or see the doctor again.  Tracey

## 2017-08-25 NOTE — TELEPHONE ENCOUNTER
Spoke with LEXI and he asked my to place this pt on the nurses [facility] for today to give pt a 6 month Trelstar hormone injection. I did this.

## 2017-08-25 NOTE — PROGRESS NOTES
Pt presents for nurse visit for Trelstar injection. See tperfecto. From Rikki Herrera and nurse Shun Menjivar. Pt accompanied by his daughter,Julia. Both were brought into exam room, whereby pt  was properly identified by spelling of last name and   Injection given as

## 2017-08-25 NOTE — PROGRESS NOTES
Glory Schmitt is a 80year old male.     HPI:   Patient presents with:  Prostate Cancer: transrectal ultrasound with gold fiducial markers placement into the prostate, I called the language line and spoke with Brian Jimenez client ID # [id number]    [id number] visit):    Current Outpatient Prescriptions:  Ciprofloxacin HCl 500 MG Oral Tab Take 1 tab by mouth every 12 hrs for 3 days starting in the morning on the day before the procedure.  Disp: 3 tablet Rfl: 0   cefdinir 300 MG Oral Cap Take 1 cap by mouth every Capsule Delayed Release Take 40 mg by mouth. Disp:  Rfl:    sucralfate 1 G Oral Tab Take 1 g by mouth. Disp:  Rfl:    Warfarin Sodium 2.5 MG Oral Tab Take 2.5 mg by mouth.  Disp:  Rfl:        Allergies:  No Known Allergies      ROS:       PHYSICAL EXAM:   D

## 2017-08-25 NOTE — TELEPHONE ENCOUNTER
Noted he did not receive his trelstar injection yesterday. He needs a 6 months Trelstar as he is overdue. Order placed and nursing staff told.   I called his daughter Rhonda and told her and she will bring him in before 1 PM.  Thanks

## 2017-09-01 ENCOUNTER — HOSPITAL ENCOUNTER (OUTPATIENT)
Dept: MRI IMAGING | Facility: HOSPITAL | Age: 82
Discharge: HOME | End: 2017-09-01
Attending: RADIOLOGY
Payer: MEDICARE

## 2017-09-01 ENCOUNTER — HOSPITAL ENCOUNTER (OUTPATIENT)
Dept: GENERAL RADIOLOGY | Facility: HOSPITAL | Age: 82
Discharge: HOME | End: 2017-09-01
Attending: RADIOLOGY
Payer: MEDICARE

## 2017-09-01 ENCOUNTER — APPOINTMENT (OUTPATIENT)
Dept: RADIATION ONCOLOGY | Facility: HOSPITAL | Age: 82
End: 2017-09-01
Attending: RADIOLOGY
Payer: MEDICARE

## 2017-09-01 DIAGNOSIS — C61 PROSTATE CANCER (HCC): ICD-10-CM

## 2017-09-01 PROCEDURE — 72195 MRI PELVIS W/O DYE: CPT | Performed by: RADIOLOGY

## 2017-09-01 PROCEDURE — 70140 X-RAY EXAM OF FACIAL BONES: CPT | Performed by: RADIOLOGY

## 2017-09-01 PROCEDURE — 77334 RADIATION TREATMENT AID(S): CPT | Performed by: RADIOLOGY

## 2017-09-05 PROCEDURE — 77399 UNLISTED PX MED RADJ PHYSICS: CPT | Performed by: RADIOLOGY

## 2017-09-07 PROCEDURE — 77300 RADIATION THERAPY DOSE PLAN: CPT | Performed by: RADIOLOGY

## 2017-09-07 PROCEDURE — 77338 DESIGN MLC DEVICE FOR IMRT: CPT | Performed by: RADIOLOGY

## 2017-09-07 PROCEDURE — 77301 RADIOTHERAPY DOSE PLAN IMRT: CPT | Performed by: RADIOLOGY

## 2017-09-08 PROCEDURE — 77300 RADIATION THERAPY DOSE PLAN: CPT | Performed by: RADIOLOGY

## 2017-09-08 PROCEDURE — 77338 DESIGN MLC DEVICE FOR IMRT: CPT | Performed by: RADIOLOGY

## 2017-09-14 PROCEDURE — 77385 HC IMRT SIMPLE: CPT | Performed by: RADIOLOGY

## 2017-09-15 PROCEDURE — 77386 HC IMRT COMPLEX: CPT | Performed by: RADIOLOGY

## 2017-09-15 PROCEDURE — 77385 HC IMRT SIMPLE: CPT | Performed by: RADIOLOGY

## 2017-09-18 PROCEDURE — 77385 HC IMRT SIMPLE: CPT | Performed by: RADIOLOGY

## 2017-09-19 ENCOUNTER — OFFICE VISIT (OUTPATIENT)
Dept: RADIATION ONCOLOGY | Facility: HOSPITAL | Age: 82
End: 2017-09-19
Attending: RADIOLOGY
Payer: MEDICARE

## 2017-09-19 VITALS
SYSTOLIC BLOOD PRESSURE: 172 MMHG | WEIGHT: 202.63 LBS | RESPIRATION RATE: 18 BRPM | DIASTOLIC BLOOD PRESSURE: 66 MMHG | BODY MASS INDEX: 28.37 KG/M2 | HEIGHT: 71 IN | HEART RATE: 67 BPM

## 2017-09-19 DIAGNOSIS — C61 MALIGNANT NEOPLASM OF PROSTATE (HCC): Primary | ICD-10-CM

## 2017-09-19 PROCEDURE — 77385 HC IMRT SIMPLE: CPT | Performed by: RADIOLOGY

## 2017-09-19 NOTE — PROGRESS NOTES
[facility] Radiation Treatment Management Note 1-5    Patient:  Franny Marr  Age:  80year old  Visit Diagnosis:    1.  Malignant neoplasm of prostate [facility])      Primary Rad/Onc:  Dr. May Portillo    Site Delivered Dose (G

## 2017-09-19 NOTE — PROGRESS NOTES
[facility] Radiation Treatment Management Note 1-5    Patient:  Marybeth Palomares  Age:  80year old  Visit Diagnosis:    1.  Malignant neoplasm of prostate [facility])      Primary Rad/Onc:  Dr. Yeboah Cost    Site Delivered Dose (G

## 2017-09-20 PROCEDURE — 77385 HC IMRT SIMPLE: CPT | Performed by: RADIOLOGY

## 2017-09-21 ENCOUNTER — DIETICIAN VISIT (OUTPATIENT)
Dept: NUTRITION | Facility: HOSPITAL | Age: 82
End: 2017-09-21

## 2017-09-21 VITALS — BODY MASS INDEX: 28 KG/M2 | WEIGHT: 201 LBS

## 2017-09-21 PROCEDURE — 77385 HC IMRT SIMPLE: CPT | Performed by: RADIOLOGY

## 2017-09-21 NOTE — PROGRESS NOTES
Oncology Nutrition Assessment  Due to language barrier--talked with pts daughter and she will interpret information to pt.     Ht Readings from Last 1 Encounters:  09/19/17 : 180.3 cm (5' 11\")      Wt Readings from Last 1 Encounters:  09/21/17 : 91.2 kg (2

## 2017-09-22 PROCEDURE — 77385 HC IMRT SIMPLE: CPT | Performed by: RADIOLOGY

## 2017-09-22 PROCEDURE — 77336 RADIATION PHYSICS CONSULT: CPT | Performed by: RADIOLOGY

## 2017-09-25 PROCEDURE — 77385 HC IMRT SIMPLE: CPT | Performed by: RADIOLOGY

## 2017-09-26 ENCOUNTER — OFFICE VISIT (OUTPATIENT)
Dept: RADIATION ONCOLOGY | Facility: HOSPITAL | Age: 82
End: 2017-09-26
Attending: RADIOLOGY
Payer: MEDICARE

## 2017-09-26 VITALS
SYSTOLIC BLOOD PRESSURE: 151 MMHG | HEIGHT: 71 IN | BODY MASS INDEX: 28.39 KG/M2 | WEIGHT: 202.81 LBS | HEART RATE: 69 BPM | RESPIRATION RATE: 18 BRPM | DIASTOLIC BLOOD PRESSURE: 67 MMHG

## 2017-09-26 DIAGNOSIS — C61 MALIGNANT NEOPLASM OF PROSTATE (HCC): Primary | ICD-10-CM

## 2017-09-26 PROCEDURE — 77385 HC IMRT SIMPLE: CPT | Performed by: RADIOLOGY

## 2017-09-26 NOTE — PROGRESS NOTES
[facility] Radiation Treatment Management Note 6-10    Patient:  Therese Mayorga  Age:  80year old  Visit Diagnosis:    1.  Malignant neoplasm of prostate [facility])      Primary Rad/Onc:  Dr. Roberto Victoria    Site Delivered Dose (

## 2017-09-27 PROCEDURE — 77385 HC IMRT SIMPLE: CPT | Performed by: RADIOLOGY

## 2017-09-28 ENCOUNTER — DIETICIAN VISIT (OUTPATIENT)
Dept: NUTRITION | Facility: HOSPITAL | Age: 82
End: 2017-09-28

## 2017-09-28 VITALS — BODY MASS INDEX: 28 KG/M2 | WEIGHT: 203.81 LBS

## 2017-09-28 PROCEDURE — 77385 HC IMRT SIMPLE: CPT | Performed by: RADIOLOGY

## 2017-09-28 NOTE — PROGRESS NOTES
son interpreted for pt    Ht Readings from Last 1 Encounters:  09/26/17 : 180.3 cm (5' 11\")      Wt Readings from Last 1 Encounters:  09/28/17 : 92.4 kg (203 lb 12.8 oz)    BMI Calculated: Body mass index is 28.42 kg/m². Weight History:   Wt Reading

## 2017-09-29 PROCEDURE — 77385 HC IMRT SIMPLE: CPT | Performed by: RADIOLOGY

## 2017-09-29 PROCEDURE — 77336 RADIATION PHYSICS CONSULT: CPT | Performed by: RADIOLOGY

## 2017-10-01 ENCOUNTER — APPOINTMENT (OUTPATIENT)
Dept: RADIATION ONCOLOGY | Facility: HOSPITAL | Age: 82
End: 2017-10-01
Attending: RADIOLOGY
Payer: MEDICARE

## 2017-10-02 PROCEDURE — 77385 HC IMRT SIMPLE: CPT | Performed by: RADIOLOGY

## 2017-10-03 ENCOUNTER — OFFICE VISIT (OUTPATIENT)
Dept: RADIATION ONCOLOGY | Facility: HOSPITAL | Age: 82
End: 2017-10-03
Attending: RADIOLOGY
Payer: MEDICARE

## 2017-10-03 VITALS
HEIGHT: 71 IN | RESPIRATION RATE: 18 BRPM | HEART RATE: 77 BPM | BODY MASS INDEX: 28.61 KG/M2 | SYSTOLIC BLOOD PRESSURE: 157 MMHG | WEIGHT: 204.38 LBS | DIASTOLIC BLOOD PRESSURE: 80 MMHG

## 2017-10-03 DIAGNOSIS — C61 MALIGNANT NEOPLASM OF PROSTATE (HCC): Primary | ICD-10-CM

## 2017-10-03 PROCEDURE — 77385 HC IMRT SIMPLE: CPT | Performed by: RADIOLOGY

## 2017-10-03 RX ORDER — LOPERAMIDE HYDROCHLORIDE 2 MG/1
2 CAPSULE ORAL 4 TIMES DAILY PRN
COMMUNITY
End: 2019-07-12

## 2017-10-03 NOTE — PROGRESS NOTES
[facility] Radiation Treatment Management Note 11-15    Patient:  Chris Gagnon  Age:  80year old  Visit Diagnosis:    1.  Malignant neoplasm of prostate [facility])      Primary Rad/Onc:  Dr. Didi Gomze    Site Delivered Dose

## 2017-10-04 PROCEDURE — 77385 HC IMRT SIMPLE: CPT | Performed by: RADIOLOGY

## 2017-10-05 ENCOUNTER — DIETICIAN VISIT (OUTPATIENT)
Dept: NUTRITION | Facility: HOSPITAL | Age: 82
End: 2017-10-05

## 2017-10-05 VITALS — WEIGHT: 204.38 LBS | BODY MASS INDEX: 29 KG/M2

## 2017-10-05 PROCEDURE — 77385 HC IMRT SIMPLE: CPT | Performed by: RADIOLOGY

## 2017-10-05 NOTE — PROGRESS NOTES
[address] daughter interpreted for pt and wife    Ht Readings from Last 1 Encounters:  10/03/17 : 180.3 cm (5' 11\")      Wt Readings from Last 1 Encounters:  10/05/17 : 92.7 kg (204 lb 6.4 oz)    BMI Calculated: Body mass index is 28.51 kg/m².   Weight Histor

## 2017-10-06 PROCEDURE — 77385 HC IMRT SIMPLE: CPT | Performed by: RADIOLOGY

## 2017-10-06 PROCEDURE — 77336 RADIATION PHYSICS CONSULT: CPT | Performed by: RADIOLOGY

## 2017-10-09 PROCEDURE — 77385 HC IMRT SIMPLE: CPT | Performed by: RADIOLOGY

## 2017-10-10 ENCOUNTER — OFFICE VISIT (OUTPATIENT)
Dept: RADIATION ONCOLOGY | Facility: HOSPITAL | Age: 82
End: 2017-10-10
Attending: RADIOLOGY
Payer: MEDICARE

## 2017-10-10 VITALS
HEART RATE: 65 BPM | SYSTOLIC BLOOD PRESSURE: 157 MMHG | BODY MASS INDEX: 28.42 KG/M2 | DIASTOLIC BLOOD PRESSURE: 64 MMHG | WEIGHT: 203 LBS | HEIGHT: 71 IN | RESPIRATION RATE: 16 BRPM

## 2017-10-10 DIAGNOSIS — C61 MALIGNANT NEOPLASM OF PROSTATE (HCC): Primary | ICD-10-CM

## 2017-10-10 PROCEDURE — 77385 HC IMRT SIMPLE: CPT | Performed by: RADIOLOGY

## 2017-10-10 NOTE — PROGRESS NOTES
[facility] Radiation Treatment Management Note 16-20    Patient:  Doroteo Edwards  Age:  80year old  Visit Diagnosis:    1.  Malignant neoplasm of prostate [facility])      Primary Rad/Onc:  Dr. Estes Big    Site Delivered Dose

## 2017-10-11 ENCOUNTER — TELEPHONE (OUTPATIENT)
Dept: HEMATOLOGY/ONCOLOGY | Facility: HOSPITAL | Age: 82
End: 2017-10-11

## 2017-10-11 PROCEDURE — 77385 HC IMRT SIMPLE: CPT | Performed by: RADIOLOGY

## 2017-10-11 NOTE — TELEPHONE ENCOUNTER
9/5 @ \[facility]\"", Spoke with Donna [place] the patient daughter. She will call us once she gets radiations time for her father so we can coincide appointments  [email or web address], Left a message for dtr asking to call back and schedule a follow up. [email or web address]. Tyron Kramer

## 2017-10-12 PROCEDURE — 77385 HC IMRT SIMPLE: CPT | Performed by: RADIOLOGY

## 2017-10-13 PROCEDURE — 77336 RADIATION PHYSICS CONSULT: CPT | Performed by: RADIOLOGY

## 2017-10-13 PROCEDURE — 77385 HC IMRT SIMPLE: CPT | Performed by: RADIOLOGY

## 2017-10-16 PROCEDURE — 77385 HC IMRT SIMPLE: CPT | Performed by: RADIOLOGY

## 2017-10-17 ENCOUNTER — OFFICE VISIT (OUTPATIENT)
Dept: RADIATION ONCOLOGY | Facility: HOSPITAL | Age: 82
End: 2017-10-17
Attending: RADIOLOGY
Payer: MEDICARE

## 2017-10-17 VITALS
DIASTOLIC BLOOD PRESSURE: 79 MMHG | BODY MASS INDEX: 28.56 KG/M2 | HEART RATE: 70 BPM | RESPIRATION RATE: 18 BRPM | WEIGHT: 204 LBS | SYSTOLIC BLOOD PRESSURE: 169 MMHG | HEIGHT: 71 IN

## 2017-10-17 DIAGNOSIS — C61 MALIGNANT NEOPLASM OF PROSTATE (HCC): Primary | ICD-10-CM

## 2017-10-17 PROCEDURE — 77385 HC IMRT SIMPLE: CPT | Performed by: RADIOLOGY

## 2017-10-17 NOTE — PROGRESS NOTES
[facility] Radiation Treatment Management Note 21-25    Patient:  Queenie Sahni  Age:  80year old  Visit Diagnosis:    1.  Malignant neoplasm of prostate [facility])      Primary Rad/Onc:  Dr. [facility]    Site Delivered Dose

## 2017-10-18 PROCEDURE — 77385 HC IMRT SIMPLE: CPT | Performed by: RADIOLOGY

## 2017-10-19 ENCOUNTER — DIETICIAN VISIT (OUTPATIENT)
Dept: NUTRITION | Facility: HOSPITAL | Age: 82
End: 2017-10-19

## 2017-10-19 VITALS — WEIGHT: 203.81 LBS | BODY MASS INDEX: 28 KG/M2

## 2017-10-19 PROCEDURE — 77385 HC IMRT SIMPLE: CPT | Performed by: RADIOLOGY

## 2017-10-19 NOTE — PROGRESS NOTES
Grand daughter interpreted for pt and wife    Ht Readings from Last 1 Encounters:  10/17/17 : 180.3 cm (5' 11\")      Wt Readings from Last 1 Encounters:  10/19/17 : 92.4 kg (203 lb 12.8 oz)    BMI Calculated: Body mass index is 28.42 kg/m².   Weight Histo

## 2017-10-20 PROCEDURE — 77385 HC IMRT SIMPLE: CPT | Performed by: RADIOLOGY

## 2017-10-20 PROCEDURE — 77336 RADIATION PHYSICS CONSULT: CPT | Performed by: RADIOLOGY

## 2017-10-23 PROCEDURE — 77385 HC IMRT SIMPLE: CPT | Performed by: RADIOLOGY

## 2017-10-24 ENCOUNTER — OFFICE VISIT (OUTPATIENT)
Dept: RADIATION ONCOLOGY | Facility: HOSPITAL | Age: 82
End: 2017-10-24
Attending: RADIOLOGY
Payer: MEDICARE

## 2017-10-24 VITALS
HEART RATE: 64 BPM | BODY MASS INDEX: 28.56 KG/M2 | RESPIRATION RATE: 18 BRPM | DIASTOLIC BLOOD PRESSURE: 60 MMHG | SYSTOLIC BLOOD PRESSURE: 154 MMHG | WEIGHT: 204 LBS | HEIGHT: 71 IN

## 2017-10-24 DIAGNOSIS — C61 MALIGNANT NEOPLASM OF PROSTATE (HCC): Primary | ICD-10-CM

## 2017-10-24 PROCEDURE — 77385 HC IMRT SIMPLE: CPT | Performed by: RADIOLOGY

## 2017-10-24 NOTE — PROGRESS NOTES
[facility] Radiation Treatment Management Note 26-30    Patient:  Berniece Spurling  Age:  80year old  Visit Diagnosis:    1.  Malignant neoplasm of prostate [facility])      Primary Rad/Onc:  Dr. Winston Wagner    Site Delivered Dose

## 2017-10-25 PROCEDURE — 77385 HC IMRT SIMPLE: CPT | Performed by: RADIOLOGY

## 2017-10-26 ENCOUNTER — DIETICIAN VISIT (OUTPATIENT)
Dept: NUTRITION | Facility: HOSPITAL | Age: 82
End: 2017-10-26

## 2017-10-26 VITALS — BODY MASS INDEX: 29 KG/M2 | WEIGHT: 204.63 LBS

## 2017-10-26 PROCEDURE — 77385 HC IMRT SIMPLE: CPT | Performed by: RADIOLOGY

## 2017-10-26 NOTE — PROGRESS NOTES
[address] daughter interpreted for pt and wife    Ht Readings from Last 1 Encounters:  10/24/17 : 180.3 cm (5' 11\")      Wt Readings from Last 1 Encounters:  10/26/17 : 92.8 kg (204 lb 9.6 oz)    BMI Calculated: Body mass index is 28.54 kg/m².   Weight Histor

## 2017-10-27 PROCEDURE — 77385 HC IMRT SIMPLE: CPT | Performed by: RADIOLOGY

## 2017-10-27 PROCEDURE — 77336 RADIATION PHYSICS CONSULT: CPT | Performed by: RADIOLOGY

## 2017-10-30 PROCEDURE — 77385 HC IMRT SIMPLE: CPT | Performed by: RADIOLOGY

## 2017-10-31 ENCOUNTER — OFFICE VISIT (OUTPATIENT)
Dept: RADIATION ONCOLOGY | Facility: HOSPITAL | Age: 82
End: 2017-10-31
Attending: RADIOLOGY
Payer: MEDICARE

## 2017-10-31 VITALS
HEIGHT: 71 IN | SYSTOLIC BLOOD PRESSURE: 178 MMHG | HEART RATE: 73 BPM | BODY MASS INDEX: 28.65 KG/M2 | RESPIRATION RATE: 18 BRPM | WEIGHT: 204.63 LBS | DIASTOLIC BLOOD PRESSURE: 76 MMHG

## 2017-10-31 DIAGNOSIS — C61 MALIGNANT NEOPLASM OF PROSTATE (HCC): Primary | ICD-10-CM

## 2017-10-31 PROCEDURE — 77385 HC IMRT SIMPLE: CPT | Performed by: RADIOLOGY

## 2017-10-31 NOTE — PROGRESS NOTES
[facility] Radiation Treatment Management Note 31-35    Patient:  Graciela Ling  Age:  80year old  Visit Diagnosis:    1.  Malignant neoplasm of prostate [facility])      Primary Rad/Onc:  Dr. Garcia Done    Site Delivered Dose

## 2017-11-01 ENCOUNTER — APPOINTMENT (OUTPATIENT)
Dept: RADIATION ONCOLOGY | Facility: HOSPITAL | Age: 82
End: 2017-11-01
Attending: RADIOLOGY
Payer: MEDICARE

## 2017-11-01 PROCEDURE — 77385 HC IMRT SIMPLE: CPT | Performed by: RADIOLOGY

## 2017-11-02 ENCOUNTER — DIETICIAN VISIT (OUTPATIENT)
Dept: NUTRITION | Facility: HOSPITAL | Age: 82
End: 2017-11-02

## 2017-11-02 VITALS — BODY MASS INDEX: 28 KG/M2 | WEIGHT: 203.81 LBS

## 2017-11-02 PROCEDURE — 77385 HC IMRT SIMPLE: CPT | Performed by: RADIOLOGY

## 2017-11-02 NOTE — PROGRESS NOTES
[address] daughter interpreted for pt and wife    Ht Readings from Last 1 Encounters:  10/31/17 : 180.3 cm (5' 11\")      Wt Readings from Last 1 Encounters:  11/02/17 : 92.4 kg (203 lb 12.8 oz)    BMI Calculated: Body mass index is 28.42 kg/m².   Weight Histo

## 2017-11-03 PROCEDURE — 77385 HC IMRT SIMPLE: CPT | Performed by: RADIOLOGY

## 2017-11-03 PROCEDURE — 77336 RADIATION PHYSICS CONSULT: CPT | Performed by: RADIOLOGY

## 2017-11-07 ENCOUNTER — APPOINTMENT (OUTPATIENT)
Dept: RADIATION ONCOLOGY | Facility: HOSPITAL | Age: 82
End: 2017-11-07
Attending: RADIOLOGY
Payer: MEDICARE

## 2017-11-08 ENCOUNTER — OFFICE VISIT (OUTPATIENT)
Dept: RADIATION ONCOLOGY | Facility: HOSPITAL | Age: 82
End: 2017-11-08
Attending: RADIOLOGY
Payer: MEDICARE

## 2017-11-08 VITALS
DIASTOLIC BLOOD PRESSURE: 57 MMHG | HEIGHT: 71 IN | HEART RATE: 71 BPM | SYSTOLIC BLOOD PRESSURE: 149 MMHG | BODY MASS INDEX: 28.39 KG/M2 | WEIGHT: 202.81 LBS

## 2017-11-08 DIAGNOSIS — C61 MALIGNANT NEOPLASM OF PROSTATE (HCC): Primary | ICD-10-CM

## 2017-11-08 PROCEDURE — 77385 HC IMRT SIMPLE: CPT | Performed by: RADIOLOGY

## 2017-11-08 RX ORDER — DIPHENOXYLATE HYDROCHLORIDE AND ATROPINE SULFATE 2.5; .025 MG/1; MG/1
1 TABLET ORAL 4 TIMES DAILY PRN
Qty: 60 TABLET | Refills: 1 | Status: SHIPPED | OUTPATIENT
Start: 2017-11-08 | End: 2019-07-12

## 2017-11-08 NOTE — PROGRESS NOTES
[facility] Radiation Treatment Management Note 36-40    Patient:  José Mancia  Age:  80year old  Visit Diagnosis:    1.  Malignant neoplasm of prostate [facility])      Primary Rad/Onc:  Dr. Huber Listen    Site Delivered Dose

## 2017-11-09 ENCOUNTER — DIETICIAN VISIT (OUTPATIENT)
Dept: NUTRITION | Facility: HOSPITAL | Age: 82
End: 2017-11-09

## 2017-11-09 VITALS — BODY MASS INDEX: 28 KG/M2 | WEIGHT: 202.63 LBS

## 2017-11-09 PROCEDURE — 77385 HC IMRT SIMPLE: CPT | Performed by: RADIOLOGY

## 2017-11-09 NOTE — PROGRESS NOTES
[address] daughter interpreted for pt and wife    Ht Readings from Last 1 Encounters:  11/08/17 : 180.3 cm (5' 11\")      Wt Readings from Last 1 Encounters:  11/09/17 : 91.9 kg (202 lb 9.6 oz)    BMI Calculated: Body mass index is 28.26 kg/m².   Weight Histor

## 2017-11-10 PROCEDURE — 77336 RADIATION PHYSICS CONSULT: CPT | Performed by: RADIOLOGY

## 2017-11-10 PROCEDURE — 77385 HC IMRT SIMPLE: CPT | Performed by: RADIOLOGY

## 2017-11-13 PROCEDURE — 77385 HC IMRT SIMPLE: CPT | Performed by: RADIOLOGY

## 2017-11-14 ENCOUNTER — OFFICE VISIT (OUTPATIENT)
Dept: RADIATION ONCOLOGY | Facility: HOSPITAL | Age: 82
End: 2017-11-14
Attending: RADIOLOGY
Payer: MEDICARE

## 2017-11-14 VITALS
RESPIRATION RATE: 16 BRPM | WEIGHT: 201.63 LBS | HEIGHT: 71 IN | BODY MASS INDEX: 28.23 KG/M2 | SYSTOLIC BLOOD PRESSURE: 165 MMHG | HEART RATE: 76 BPM | DIASTOLIC BLOOD PRESSURE: 74 MMHG

## 2017-11-14 DIAGNOSIS — C61 MALIGNANT NEOPLASM OF PROSTATE (HCC): Primary | ICD-10-CM

## 2017-11-14 PROCEDURE — 77385 HC IMRT SIMPLE: CPT | Performed by: RADIOLOGY

## 2017-11-14 NOTE — PROGRESS NOTES
[facility] Radiation Treatment Management Note 41-45    Patient:  Paulina Wilson  Age:  80year old  Visit Diagnosis:    1.  Malignant neoplasm of prostate [facility])      Primary Rad/Onc:  Dr. Marc Hernandez    Site Delivered Dose

## 2017-11-15 PROCEDURE — 77385 HC IMRT SIMPLE: CPT | Performed by: RADIOLOGY

## 2017-11-16 ENCOUNTER — DIETICIAN VISIT (OUTPATIENT)
Dept: NUTRITION | Facility: HOSPITAL | Age: 82
End: 2017-11-16

## 2017-11-16 PROCEDURE — 77385 HC IMRT SIMPLE: CPT | Performed by: RADIOLOGY

## 2017-11-16 NOTE — PROGRESS NOTES
Brief Nutrition Note:    RT complete tomorrow. Talked with family member about diet progression after completion of treatment. Verbalized understanding.       [address], [address] [facility]   Clinical Dietitian [id number]

## 2017-11-17 ENCOUNTER — NURSE ONLY (OUTPATIENT)
Dept: RADIATION ONCOLOGY | Facility: HOSPITAL | Age: 82
End: 2017-11-17

## 2017-11-17 PROCEDURE — 77385 HC IMRT SIMPLE: CPT | Performed by: RADIOLOGY

## 2017-11-17 PROCEDURE — 77336 RADIATION PHYSICS CONSULT: CPT | Performed by: RADIOLOGY

## 2017-11-17 NOTE — PATIENT INSTRUCTIONS
Follow up with Dr Cari Ashley 12/22/17 @ 300pm. Continue to eat well and stay hydrated. Continue with lomotil for the next 2-3 weeks as needed. Make appointments wit Dr Kelly Glynn and Dr Jace Castro for follow up. Please call with any questions or concerns.

## 2017-11-17 NOTE — PROGRESS NOTES
Pt completed radiation to the prostate this afternoon. Tolerated well overall. Encouraged to make follow up appointments with Terrence Gutierrez and Akhil. Follow up with DR Kel Howard in 1 month. No c/o diarrhea, urianry urgency present. Lomotil prn.  Updated Nena Span

## 2017-11-21 NOTE — PROGRESS NOTES
[facility]    PATIENT'S NAME: Qamar Bazan   RADIATION ONCOLOGIST: Nik Bowen.  Sophy Stewart MD   PATIENT ACCOUNT #: [de-identified] LOCATION: [address] [place] [address] RECORD #: [id number] YOB: 1935   DATE: 11/17/2017       RADIATION received all 45 prescribed radiation treatments. TREATMENT SUMMARY:  Based upon the fact that he had very high risk disease, but no evidence of distant metastases, we opted to treat the patient with local and regional radiation.   To that extent, I treat

## 2017-11-22 PROCEDURE — 77336 RADIATION PHYSICS CONSULT: CPT | Performed by: RADIOLOGY

## 2017-11-28 ENCOUNTER — TELEPHONE (OUTPATIENT)
Dept: HEMATOLOGY/ONCOLOGY | Facility: HOSPITAL | Age: 82
End: 2017-11-28

## 2017-11-30 ENCOUNTER — OFFICE VISIT (OUTPATIENT)
Dept: SURGERY | Facility: CLINIC | Age: 82
End: 2017-11-30

## 2017-11-30 VITALS
RESPIRATION RATE: 16 BRPM | SYSTOLIC BLOOD PRESSURE: 154 MMHG | TEMPERATURE: 98 F | HEART RATE: 64 BPM | DIASTOLIC BLOOD PRESSURE: 68 MMHG | BODY MASS INDEX: 28.14 KG/M2 | HEIGHT: 71 IN | WEIGHT: 201 LBS

## 2017-11-30 DIAGNOSIS — C61 PROSTATE CANCER (HCC): Primary | ICD-10-CM

## 2017-11-30 PROCEDURE — 99213 OFFICE O/P EST LOW 20 MIN: CPT | Performed by: UROLOGY

## 2017-11-30 PROCEDURE — 96372 THER/PROPH/DIAG INJ SC/IM: CPT | Performed by: UROLOGY

## 2017-11-30 PROCEDURE — G0463 HOSPITAL OUTPT CLINIC VISIT: HCPCS | Performed by: UROLOGY

## 2017-11-30 NOTE — PROGRESS NOTES
Queenie Sahni is a 80year old male. HPI:   Patient presents with:  Prostate Cancer: pt states frontal head pain started 3 days ago    80-year-old accompanied by his son and grandson in follow-up to a visit August 24, 2017.   The patient has clin Oral Tab Take 1 tablet by mouth 4 (four) times daily as needed for Diarrhea. Disp: 60 tablet Rfl: 1   Loperamide HCl 2 MG Oral Cap Take 2 mg by mouth 4 (four) times daily as needed for Diarrhea.  Disp:  Rfl:    bicalutamide 50 MG Oral Tab Take 1 tablet (50 received a 3 month injection of Lupron by nursing staff. ASSESSMENT/PLAN:   Assessment   Prostate cancer ([facility]cc)  (primary encounter diagnosis)    Reviewed findings with patient and son and grandson.   Recommended that he go to the emergency room to get l

## 2017-11-30 NOTE — PROGRESS NOTES
I took the pt's vitals again and his BP was lower at 154/68. Pt relayed to his grandson because he does not speak english that he still has slight headache pressure.  I told him that [facility] [place] suggested that he go to the ER for assessment but the grandson states

## 2017-12-01 ENCOUNTER — APPOINTMENT (OUTPATIENT)
Dept: LAB | Facility: HOSPITAL | Age: 82
End: 2017-12-01
Attending: INTERNAL MEDICINE
Payer: MEDICARE

## 2017-12-01 ENCOUNTER — TELEPHONE (OUTPATIENT)
Dept: HEMATOLOGY/ONCOLOGY | Facility: HOSPITAL | Age: 82
End: 2017-12-01

## 2017-12-01 ENCOUNTER — OFFICE VISIT (OUTPATIENT)
Dept: HEMATOLOGY/ONCOLOGY | Facility: HOSPITAL | Age: 82
End: 2017-12-01
Attending: INTERNAL MEDICINE
Payer: MEDICARE

## 2017-12-01 VITALS
HEIGHT: 71 IN | SYSTOLIC BLOOD PRESSURE: 163 MMHG | RESPIRATION RATE: 18 BRPM | BODY MASS INDEX: 28.28 KG/M2 | HEART RATE: 79 BPM | TEMPERATURE: 98 F | DIASTOLIC BLOOD PRESSURE: 64 MMHG | WEIGHT: 202 LBS

## 2017-12-01 DIAGNOSIS — C61 MALIGNANT NEOPLASM OF PROSTATE (HCC): Primary | ICD-10-CM

## 2017-12-01 DIAGNOSIS — C61 MALIGNANT NEOPLASM OF PROSTATE (HCC): ICD-10-CM

## 2017-12-01 LAB
PSA SERPL-MCNC: 0 NG/ML (ref 0–4)
TESTOST SERPL-MCNC: 30 NG/DL (ref 175–781)

## 2017-12-01 PROCEDURE — 99214 OFFICE O/P EST MOD 30 MIN: CPT | Performed by: INTERNAL MEDICINE

## 2017-12-01 PROCEDURE — 36415 COLL VENOUS BLD VENIPUNCTURE: CPT

## 2017-12-01 PROCEDURE — 84403 ASSAY OF TOTAL TESTOSTERONE: CPT

## 2017-12-01 PROCEDURE — G0463 HOSPITAL OUTPT CLINIC VISIT: HCPCS | Performed by: INTERNAL MEDICINE

## 2017-12-01 PROCEDURE — 84153 ASSAY OF PSA TOTAL: CPT

## 2017-12-01 NOTE — TELEPHONE ENCOUNTER
Spoke with patient's daughter and let her know that pt's PSA today was 0. She verbalized understanding.

## 2017-12-01 NOTE — PROGRESS NOTES
Cancer Center Progress Note    Patient Name: Caryle March   YOB: 1935   Medical Record Number: [id number]   Attending Physician: Cydney Priest M.D.      Chief Complaint:  Prostate cancer    History of Present Illness:  Cancer history: History:  No date: AMPUTATION LOW LEG,CIRCULAR Left      Comment: below the knee amputation  No date: CATH PERIPHERAL STENT Right      Comment: lower leg    Family History:  Family History   Problem Relation Age of Onset   • Hypertension Father    • Diabet In Vitro Strip, 1 strip., Disp: , Rfl:   •  FLUoxetine HCl 10 MG Oral Cap, Take 10 mg by mouth., Disp: , Rfl:   •  HYDROcodone-acetaminophen 5-325 MG Oral Tab, Take 1 tablet by mouth., Disp: , Rfl:   •  Insulin Syringe 30G X 5/16\" 1 ML Does not apply Misc in the last 504 hours. Lab Results  Component Value Date   GFRNAA >60 03/28/2017   GFRAA >60 03/28/2017       Radiology:  none    No matching staging information was found for the patient.     Impression and Plan:  63-year-old male with a history of

## 2018-01-26 ENCOUNTER — OFFICE VISIT (OUTPATIENT)
Dept: RADIATION ONCOLOGY | Facility: HOSPITAL | Age: 83
End: 2018-01-26
Attending: RADIOLOGY
Payer: MEDICARE

## 2018-01-26 VITALS
DIASTOLIC BLOOD PRESSURE: 75 MMHG | RESPIRATION RATE: 16 BRPM | WEIGHT: 196 LBS | BODY MASS INDEX: 27.44 KG/M2 | SYSTOLIC BLOOD PRESSURE: 163 MMHG | TEMPERATURE: 98 F | HEIGHT: 71 IN | HEART RATE: 77 BPM

## 2018-01-26 DIAGNOSIS — C61 MALIGNANT NEOPLASM OF PROSTATE (HCC): Primary | ICD-10-CM

## 2018-01-26 PROCEDURE — 99211 OFF/OP EST MAY X REQ PHY/QHP: CPT

## 2018-01-26 NOTE — PROGRESS NOTES
Pt seen in follow up with Dr Santos Marie having completed radiation to the prostate 11/17/17. Pt following with Dr's Wiley Gosselin. Most recent PSA 0.0 from 12/1/17. Next PSA and testosterone levels ordered for March.  AUA score is 3, which places pt in the

## 2018-01-29 NOTE — PROGRESS NOTES
[facility]    PATIENT'S NAME: Lee Ortiz   RADIATION ONCOLOGIST: Gilbert Tovar.  Carolene Peabody, MD   PATIENT ACCOUNT #: [de-identified] LOCATION: [address] [place] [address] RECORD #: [id number] YOB: 1935   FOLLOW-UP DATE: 01/26/2018 well.  He is now 2 months since completing his radiation and denies any significant issues or problems at the current time. He reports only some occasional diarrhea which has been improving over time.   He reports no significant urinary complaints apart fr for his next hormonal injection and I would like to see him again for followup in 6 months. I would like to get a PSA at that time, if there is not one done around that time as well.   I told the patient that, if he should have any problems or questions pr

## 2018-02-28 ENCOUNTER — APPOINTMENT (OUTPATIENT)
Dept: LAB | Facility: HOSPITAL | Age: 83
End: 2018-02-28
Attending: INTERNAL MEDICINE
Payer: MEDICARE

## 2018-02-28 DIAGNOSIS — C61 PROSTATE CANCER (HCC): ICD-10-CM

## 2018-02-28 LAB
PSA SERPL-MCNC: 0 NG/ML (ref 0–4)
TESTOST SERPL-MCNC: 19 NG/DL (ref 175–781)

## 2018-02-28 PROCEDURE — 84153 ASSAY OF PSA TOTAL: CPT

## 2018-02-28 PROCEDURE — 84403 ASSAY OF TOTAL TESTOSTERONE: CPT

## 2018-02-28 PROCEDURE — 36415 COLL VENOUS BLD VENIPUNCTURE: CPT

## 2018-03-02 ENCOUNTER — APPOINTMENT (OUTPATIENT)
Dept: HEMATOLOGY/ONCOLOGY | Facility: HOSPITAL | Age: 83
End: 2018-03-02
Attending: INTERNAL MEDICINE
Payer: MEDICARE

## 2018-03-02 ENCOUNTER — OFFICE VISIT (OUTPATIENT)
Dept: SURGERY | Facility: CLINIC | Age: 83
End: 2018-03-02

## 2018-03-02 VITALS
HEART RATE: 100 BPM | TEMPERATURE: 98 F | RESPIRATION RATE: 16 BRPM | WEIGHT: 200 LBS | DIASTOLIC BLOOD PRESSURE: 90 MMHG | HEIGHT: 71 IN | BODY MASS INDEX: 28 KG/M2 | SYSTOLIC BLOOD PRESSURE: 153 MMHG

## 2018-03-02 DIAGNOSIS — C61 PROSTATE CANCER (HCC): Primary | ICD-10-CM

## 2018-03-02 PROCEDURE — 96402 CHEMO HORMON ANTINEOPL SQ/IM: CPT | Performed by: UROLOGY

## 2018-03-02 PROCEDURE — 99213 OFFICE O/P EST LOW 20 MIN: CPT | Performed by: UROLOGY

## 2018-03-02 PROCEDURE — G0463 HOSPITAL OUTPT CLINIC VISIT: HCPCS | Performed by: UROLOGY

## 2018-03-07 NOTE — PROGRESS NOTES
Chang Huynh is a 80year old male. HPI:   Patient presents with:  Prostate Cancer: 3 month visit      80year-old in follow-up to a previous office visit November 30, 2017.   Diagnosed with clinical high risk [place] 5+5 prostate cancer [facility]      Past Surgical History:  No date: AMPUTATION LOW LEG,CIRCULAR Left      Comment: below the knee amputation  No date: CATH PERIPHERAL STENT Right      Comment: lower leg   Family History   Problem Relation Age of Onset   • Hy Insulin Syringe 30G X 5/16\" 1 ML Does not apply Misc 1 Syringe. Disp:  Rfl:    Insulin NPH, Human,, Isophane, 100 UNIT/ML Subcutaneous Suspension Pen-injector Inject 3-5 Units into the skin.  Disp:  Rfl:    Insulin NPH, Human,, Isophane, 100 UNIT/ML Subc

## 2018-03-09 ENCOUNTER — OFFICE VISIT (OUTPATIENT)
Dept: HEMATOLOGY/ONCOLOGY | Facility: HOSPITAL | Age: 83
End: 2018-03-09
Attending: INTERNAL MEDICINE
Payer: MEDICARE

## 2018-03-09 VITALS
HEIGHT: 71 IN | DIASTOLIC BLOOD PRESSURE: 73 MMHG | RESPIRATION RATE: 18 BRPM | SYSTOLIC BLOOD PRESSURE: 147 MMHG | HEART RATE: 91 BPM | TEMPERATURE: 98 F | WEIGHT: 190 LBS | BODY MASS INDEX: 26.6 KG/M2

## 2018-03-09 DIAGNOSIS — C61 MALIGNANT NEOPLASM OF PROSTATE (HCC): Primary | ICD-10-CM

## 2018-03-09 DIAGNOSIS — I10 HYPERTENSION, UNSPECIFIED TYPE: ICD-10-CM

## 2018-03-09 DIAGNOSIS — E10.9 TYPE 1 DIABETES MELLITUS WITHOUT COMPLICATION (HCC): ICD-10-CM

## 2018-03-09 PROCEDURE — 99214 OFFICE O/P EST MOD 30 MIN: CPT | Performed by: INTERNAL MEDICINE

## 2018-03-09 NOTE — PROGRESS NOTES
Cancer Center Progress Note    Patient Name: Chang Huynh   YOB: 1935   Medical Record Number: [id number]   Attending Physician: Ria Nixon M.D.      Chief Complaint:  Prostate cancer    History of Present Illness:  Cancer history: History:  No date: AMPUTATION LOW LEG,CIRCULAR Left      Comment: below the knee amputation  No date: CATH PERIPHERAL STENT Right      Comment: lower leg    Family History:  Family History   Problem Relation Age of Onset   • Hypertension Father    • Diabet Rfl:   •  Atorvastatin Calcium 10 MG Oral Tab, Take 10 mg by mouth., Disp: , Rfl:   •  Glucose Blood In Vitro Strip, 1 strip., Disp: , Rfl:   •  FLUoxetine HCl 10 MG Oral Cap, Take 10 mg by mouth., Disp: , Rfl:   •  HYDROcodone-acetaminophen 5-325 MG Oral edema. Neurological: 5/5 motor x4. Laboratory:  No results for input(s): WBC, HGB, PLT, NE, NEUT in the last 504 hours.         Lab Results  Component Value Date   GFRNAA >60 03/28/2017   GFRAA >60 03/28/2017       Radiology:  none    No matching st

## 2018-06-01 ENCOUNTER — OFFICE VISIT (OUTPATIENT)
Dept: SURGERY | Facility: CLINIC | Age: 83
End: 2018-06-01

## 2018-06-01 ENCOUNTER — APPOINTMENT (OUTPATIENT)
Dept: HEMATOLOGY/ONCOLOGY | Facility: HOSPITAL | Age: 83
End: 2018-06-01
Attending: INTERNAL MEDICINE

## 2018-06-01 VITALS
SYSTOLIC BLOOD PRESSURE: 149 MMHG | WEIGHT: 200 LBS | DIASTOLIC BLOOD PRESSURE: 68 MMHG | TEMPERATURE: 98 F | HEART RATE: 84 BPM | BODY MASS INDEX: 28 KG/M2

## 2018-06-01 DIAGNOSIS — C61 PROSTATE CANCER (HCC): Primary | ICD-10-CM

## 2018-06-01 PROCEDURE — 96402 CHEMO HORMON ANTINEOPL SQ/IM: CPT | Performed by: UROLOGY

## 2018-06-01 PROCEDURE — 99213 OFFICE O/P EST LOW 20 MIN: CPT | Performed by: UROLOGY

## 2018-06-01 PROCEDURE — G0463 HOSPITAL OUTPT CLINIC VISIT: HCPCS | Performed by: UROLOGY

## 2018-06-20 ENCOUNTER — APPOINTMENT (OUTPATIENT)
Dept: LAB | Facility: HOSPITAL | Age: 83
End: 2018-06-20
Attending: INTERNAL MEDICINE
Payer: MEDICARE

## 2018-06-20 DIAGNOSIS — C61 MALIGNANT NEOPLASM OF PROSTATE (HCC): ICD-10-CM

## 2018-06-20 PROCEDURE — 36415 COLL VENOUS BLD VENIPUNCTURE: CPT

## 2018-06-20 PROCEDURE — 84403 ASSAY OF TOTAL TESTOSTERONE: CPT

## 2018-06-20 PROCEDURE — 84153 ASSAY OF PSA TOTAL: CPT

## 2018-06-22 ENCOUNTER — OFFICE VISIT (OUTPATIENT)
Dept: HEMATOLOGY/ONCOLOGY | Facility: HOSPITAL | Age: 83
End: 2018-06-22
Attending: INTERNAL MEDICINE
Payer: MEDICARE

## 2018-06-22 VITALS
WEIGHT: 204 LBS | DIASTOLIC BLOOD PRESSURE: 65 MMHG | BODY MASS INDEX: 28.56 KG/M2 | RESPIRATION RATE: 16 BRPM | HEART RATE: 68 BPM | TEMPERATURE: 100 F | HEIGHT: 71 IN | SYSTOLIC BLOOD PRESSURE: 152 MMHG

## 2018-06-22 DIAGNOSIS — E10.9 TYPE 1 DIABETES MELLITUS WITHOUT COMPLICATION (HCC): ICD-10-CM

## 2018-06-22 DIAGNOSIS — I10 HYPERTENSION, UNSPECIFIED TYPE: ICD-10-CM

## 2018-06-22 DIAGNOSIS — C61 MALIGNANT NEOPLASM OF PROSTATE (HCC): Primary | ICD-10-CM

## 2018-06-22 PROCEDURE — 99214 OFFICE O/P EST MOD 30 MIN: CPT | Performed by: INTERNAL MEDICINE

## 2018-06-22 NOTE — PROGRESS NOTES
Cancer Center Progress Note    Patient Name: Carie Brunson   YOB: 1935   Medical Record Number: [id number]   Attending Physician: Scot Pina M.D.      Chief Complaint:  Prostate cancer    History of Present Illness:  Cancer history: History:  No date: AMPUTATION LOW LEG,CIRCULAR Left      Comment: below the knee amputation  No date: CATH PERIPHERAL STENT Right      Comment: lower leg    Family History:  Family History   Problem Relation Age of Onset   • Hypertension Father    • Diabet Rfl:   •  Atorvastatin Calcium 10 MG Oral Tab, Take 10 mg by mouth., Disp: , Rfl:   •  Glucose Blood In Vitro Strip, 1 strip., Disp: , Rfl:   •  FLUoxetine HCl 10 MG Oral Cap, Take 10 mg by mouth., Disp: , Rfl:   •  HYDROcodone-acetaminophen 5-325 MG Oral edema. Neurological: 5/5 motor x4. Laboratory:  No results for input(s): WBC, HGB, PLT, NE, NEUT in the last 504 hours.         Lab Results  Component Value Date   GFRNAA >60 03/28/2017   GFRAA >60 03/28/2017       Radiology:  none    No matching st

## 2018-06-22 NOTE — PROGRESS NOTES
Cancer Center Progress Note    Patient Name: Randy Miller   YOB: 1935   Medical Record Number: [id number]   Attending Physician: Kezia Mead M.D.      Chief Complaint:  Prostate cancer    History of Present Illness:  Cancer history: History:  No date: AMPUTATION LOW LEG,CIRCULAR Left      Comment: below the knee amputation  No date: CATH PERIPHERAL STENT Right      Comment: lower leg    Family History:  Family History   Problem Relation Age of Onset   • Hypertension Father    • Diabet Rfl:   •  Atorvastatin Calcium 10 MG Oral Tab, Take 10 mg by mouth., Disp: , Rfl:   •  Glucose Blood In Vitro Strip, 1 strip., Disp: , Rfl:   •  FLUoxetine HCl 10 MG Oral Cap, Take 10 mg by mouth., Disp: , Rfl:   •  HYDROcodone-acetaminophen 5-325 MG Oral edema. Neurological: 5/5 motor x4. Laboratory:  No results for input(s): WBC, HGB, PLT, NE, NEUT in the last 504 hours.         Lab Results  Component Value Date   GFRNAA >60 03/28/2017   GFRAA >60 03/28/2017       Radiology:  none    No matching st

## 2018-09-27 ENCOUNTER — APPOINTMENT (OUTPATIENT)
Dept: LAB | Facility: HOSPITAL | Age: 83
End: 2018-09-27
Attending: INTERNAL MEDICINE
Payer: MEDICARE

## 2018-09-27 ENCOUNTER — OFFICE VISIT (OUTPATIENT)
Dept: HEMATOLOGY/ONCOLOGY | Facility: HOSPITAL | Age: 83
End: 2018-09-27
Attending: INTERNAL MEDICINE
Payer: MEDICARE

## 2018-09-27 VITALS
TEMPERATURE: 98 F | BODY MASS INDEX: 29.4 KG/M2 | HEART RATE: 84 BPM | WEIGHT: 210 LBS | DIASTOLIC BLOOD PRESSURE: 61 MMHG | SYSTOLIC BLOOD PRESSURE: 134 MMHG | HEIGHT: 71 IN | RESPIRATION RATE: 18 BRPM

## 2018-09-27 DIAGNOSIS — C61 MALIGNANT NEOPLASM OF PROSTATE (HCC): ICD-10-CM

## 2018-09-27 DIAGNOSIS — I10 HYPERTENSION, UNSPECIFIED TYPE: ICD-10-CM

## 2018-09-27 DIAGNOSIS — C61 MALIGNANT NEOPLASM OF PROSTATE (HCC): Primary | ICD-10-CM

## 2018-09-27 DIAGNOSIS — E10.9 TYPE 1 DIABETES MELLITUS WITHOUT COMPLICATION (HCC): ICD-10-CM

## 2018-09-27 PROCEDURE — 99214 OFFICE O/P EST MOD 30 MIN: CPT | Performed by: INTERNAL MEDICINE

## 2018-09-27 PROCEDURE — 36415 COLL VENOUS BLD VENIPUNCTURE: CPT

## 2018-09-27 PROCEDURE — 84153 ASSAY OF PSA TOTAL: CPT

## 2018-09-27 PROCEDURE — 84403 ASSAY OF TOTAL TESTOSTERONE: CPT

## 2018-09-27 NOTE — PROGRESS NOTES
Cancer Center Progress Note    Patient Name: Amber Marcum   YOB: 1935   Medical Record Number: [id number]   Attending Physician: Mak García M.D.      Chief Complaint:  Prostate cancer    History of Present Illness:  Cancer history: History:  No date: AMPUTATION LOW LEG,CIRCULAR; Left      Comment:  below the knee amputation  No date: CATH PERIPHERAL STENT; Right      Comment:  lower leg  3/15/2017: CYSTOSCOPY; N/A      Comment:  Performed by Donny Ceballos MD at [facility] Neomycin-Polymyxin-HC 3.5-57234-4 Otic Solution, INT 3 GTS INTO LEFT EAR  ONLY TID, Disp: , Rfl: 0  •  diphenoxylate-atropine (LOMOTIL) 2.5-0.025 MG Oral Tab, Take 1 tablet by mouth 4 (four) times daily as needed for Diarrhea., Disp: 60 tablet, Rfl: 1  • Release, Take 40 mg by mouth., Disp: , Rfl:   •  sucralfate 1 G Oral Tab, Take 1 g by mouth., Disp: , Rfl:   •  Warfarin Sodium 2.5 MG Oral Tab, Take 2.5 mg by mouth., Disp: , Rfl:     Allergies:  No Known Allergies     Review of Systems:  All other system chemotherapy  –We will have him return to clinic in 3 months with follow-up PSA and testosterone. The patient's emotional well being was assessed and resources were discussed.   Appropriate resources were reviewed and discussed with the pateint and fam

## 2018-10-18 ENCOUNTER — OFFICE VISIT (OUTPATIENT)
Dept: SURGERY | Facility: CLINIC | Age: 83
End: 2018-10-18
Payer: MEDICARE

## 2018-10-18 ENCOUNTER — APPOINTMENT (OUTPATIENT)
Dept: LAB | Facility: HOSPITAL | Age: 83
End: 2018-10-18
Attending: UROLOGY
Payer: MEDICARE

## 2018-10-18 VITALS
DIASTOLIC BLOOD PRESSURE: 65 MMHG | BODY MASS INDEX: 29.4 KG/M2 | HEIGHT: 71 IN | TEMPERATURE: 98 F | HEART RATE: 71 BPM | WEIGHT: 210 LBS | SYSTOLIC BLOOD PRESSURE: 129 MMHG

## 2018-10-18 DIAGNOSIS — C61 PROSTATE CANCER (HCC): ICD-10-CM

## 2018-10-18 DIAGNOSIS — R10.32 LEFT LOWER QUADRANT ABDOMINAL PAIN OF UNKNOWN ETIOLOGY: ICD-10-CM

## 2018-10-18 DIAGNOSIS — C61 PROSTATE CANCER (HCC): Primary | ICD-10-CM

## 2018-10-18 PROCEDURE — 80048 BASIC METABOLIC PNL TOTAL CA: CPT

## 2018-10-18 PROCEDURE — G0463 HOSPITAL OUTPT CLINIC VISIT: HCPCS | Performed by: UROLOGY

## 2018-10-18 PROCEDURE — 99213 OFFICE O/P EST LOW 20 MIN: CPT | Performed by: UROLOGY

## 2018-10-18 PROCEDURE — 36415 COLL VENOUS BLD VENIPUNCTURE: CPT

## 2018-10-18 NOTE — PROGRESS NOTES
Marybeth Palomares is a 80year old male. HPI:   Patient presents with:  Prostate Cancer: PT presents for 3 month follow up. PT complains of LLQ for the past two months.        17-year-old male accompanied by his wife as well as his grandson who acts diabetes mellitus ([facility]    • Prostate cancer ([facility] 2013 in [facility]      Past Surgical History:   Procedure Laterality Date   • AMPUTATION LOW LEG,CIRCULAR Left     below the knee amputation   • CATH PERIPHERAL STENT Right     lower leg   • CYSTOSCOPY N/A 3/ Insulin NPH, Human,, Isophane, 100 UNIT/ML Subcutaneous Suspension Pen-injector Inject 3-5 Units into the skin. Disp:  Rfl:    Insulin NPH, Human,, Isophane, 100 UNIT/ML Subcutaneous Suspension Inject 10 Units into the skin.  Disp:  Rfl:    Insulin Syring

## 2018-11-09 ENCOUNTER — HOSPITAL ENCOUNTER (OUTPATIENT)
Dept: CT IMAGING | Facility: HOSPITAL | Age: 83
Discharge: HOME | End: 2018-11-09
Attending: UROLOGY
Payer: MEDICARE

## 2018-11-09 DIAGNOSIS — C61 PROSTATE CANCER (HCC): ICD-10-CM

## 2018-11-09 DIAGNOSIS — R10.32 LEFT LOWER QUADRANT ABDOMINAL PAIN OF UNKNOWN ETIOLOGY: ICD-10-CM

## 2018-11-09 PROCEDURE — 74178 CT ABD&PLV WO CNTR FLWD CNTR: CPT | Performed by: UROLOGY

## 2018-11-09 PROCEDURE — 82565 ASSAY OF CREATININE: CPT

## 2018-11-09 PROCEDURE — 74177 CT ABD & PELVIS W/CONTRAST: CPT | Performed by: UROLOGY

## 2018-11-28 ENCOUNTER — TELEPHONE (OUTPATIENT)
Dept: SURGERY | Facility: CLINIC | Age: 83
End: 2018-11-28

## 2018-12-03 NOTE — TELEPHONE ENCOUNTER
CT scan shows no worrisome findings from the perspective of his history of prostate cancer. He should follow up accordingly.

## 2019-01-26 ENCOUNTER — HOSPITAL ENCOUNTER (OUTPATIENT)
Age: 84
Discharge: OTHER INSTITUTION | End: 2019-01-26
Attending: EMERGENCY MEDICINE
Payer: MEDICARE

## 2019-01-26 VITALS
DIASTOLIC BLOOD PRESSURE: 81 MMHG | OXYGEN SATURATION: 99 % | BODY MASS INDEX: 28 KG/M2 | RESPIRATION RATE: 16 BRPM | WEIGHT: 200 LBS | TEMPERATURE: 97 F | HEART RATE: 102 BPM | SYSTOLIC BLOOD PRESSURE: 178 MMHG

## 2019-01-26 DIAGNOSIS — Z79.4 TYPE 2 DIABETES MELLITUS WITH HYPOGLYCEMIA WITHOUT COMA, WITH LONG-TERM CURRENT USE OF INSULIN (HCC): Primary | ICD-10-CM

## 2019-01-26 DIAGNOSIS — E11.649 TYPE 2 DIABETES MELLITUS WITH HYPOGLYCEMIA WITHOUT COMA, WITH LONG-TERM CURRENT USE OF INSULIN (HCC): Primary | ICD-10-CM

## 2019-01-26 PROCEDURE — 99213 OFFICE O/P EST LOW 20 MIN: CPT

## 2019-01-26 PROCEDURE — 82962 GLUCOSE BLOOD TEST: CPT

## 2019-01-26 RX ORDER — 0.9 % SODIUM CHLORIDE 0.9 %
1000 INTRAVENOUS SOLUTION INTRAVENOUS ONCE
Status: COMPLETED | OUTPATIENT
Start: 2019-01-26 | End: 2019-01-26

## 2019-01-26 NOTE — ED PROVIDER NOTES
Patient Seen in: [facility] In [address] [place] [address]    History   Patient presents with:  Hypoglycemia (metabolic)    Stated Complaint: low sugar    HPI    The patient is an 79-year-old male with a past history of insulin-dependent diabetes, jania Alcohol/week: 0.0 oz    Drug use: No      Review of Systems    Positive for stated complaint: low sugar  Other systems are as noted in HPI. Constitutional and vital signs reviewed. All other systems reviewed and negative except as noted above.     Familia of insulin ([facility]  (primary encounter diagnosis)    Disposition: Ic to ed  1/26/2019  4:15 pm    Follow-up:  No follow-up provider specified.       Medications Prescribed:  Current Discharge Medication List

## 2019-01-26 NOTE — ED NOTES
911 called for transport. IVF started left hand at [facility]. To go to [facility]. Family educated on need to call 911 for further events of hypoglycemia.

## 2019-01-26 NOTE — ED INITIAL ASSESSMENT (HPI)
Per son checked dads blood sugar before a meal and it was 38 gave candy and soda - just return from [place] no cough cold

## 2019-01-26 NOTE — ED NOTES
family very hesitant to transfer if blood sugar goes up after soda candy po fluids and iv here and 50 %dextrose ivf by paramedics Dr. Tee Tovar spoke with pt and family explained need to find out why sugar is so low even with eating and additional sugar. agree

## 2019-01-28 ENCOUNTER — OFFICE VISIT (OUTPATIENT)
Dept: HEMATOLOGY/ONCOLOGY | Facility: HOSPITAL | Age: 84
End: 2019-01-28
Attending: INTERNAL MEDICINE
Payer: MEDICARE

## 2019-01-28 VITALS
DIASTOLIC BLOOD PRESSURE: 70 MMHG | HEART RATE: 101 BPM | RESPIRATION RATE: 18 BRPM | HEIGHT: 71 IN | BODY MASS INDEX: 28 KG/M2 | SYSTOLIC BLOOD PRESSURE: 116 MMHG | WEIGHT: 200 LBS | TEMPERATURE: 98 F

## 2019-01-28 DIAGNOSIS — C61 MALIGNANT NEOPLASM OF PROSTATE (HCC): ICD-10-CM

## 2019-01-28 DIAGNOSIS — E10.9 TYPE 1 DIABETES MELLITUS WITHOUT COMPLICATION (HCC): Primary | ICD-10-CM

## 2019-01-28 DIAGNOSIS — I10 HYPERTENSION, UNSPECIFIED TYPE: ICD-10-CM

## 2019-01-28 LAB
PSA SERPL-MCNC: 0 NG/ML (ref 0–4)
PSA SERPL-MCNC: <0.01 NG/ML (ref 0.01–4)
TESTOST SERPL-MCNC: 94 NG/DL (ref 175–781)

## 2019-01-28 PROCEDURE — 99214 OFFICE O/P EST MOD 30 MIN: CPT | Performed by: INTERNAL MEDICINE

## 2019-01-28 NOTE — PROGRESS NOTES
Cancer Center Progress Note    Patient Name: Nancie Alfonso   YOB: 1935   Medical Record Number: [id number]   Attending Physician: Becky Brand M.D.      Chief Complaint:  Prostate cancer    History of Present Illness:  Cancer history: High blood pressure    • High cholesterol    • Peripheral vascular disease due to secondary diabetes mellitus ([facility]    • Prostate cancer ([facility] 2013 in [facility]       Past Surgical History:  Past Surgical History:   Procedure Laterality Date   • AMPUTATION LO Helmet: Not Asked        Seat Belt: Not Asked        Self-Exams: Not Asked    Social History Narrative      Not on file      Current Medications:    Current Outpatient Medications:   •  Neomycin-Polymyxin-HC 3.5-02675-1 Otic Solution, INT 3 GTS INTO LEFT E MetFORMIN HCl 850 MG Oral Tab, Take 850 mg by mouth., Disp: , Rfl:   •  metoprolol Tartrate 25 MG Oral Tab, Take 25 mg by mouth., Disp: , Rfl:   •  Omeprazole 40 MG Oral Capsule Delayed Release, Take 40 mg by mouth., Disp: , Rfl:   •  sucralfate 1 G Oral T Socrates 5 + 5 disease, and a PSA of 25.     –Completed XRT 11/2017 also completed greater than 18 months of androgen deprivation therapy with urology  –Discussed the addition of abiraterone or docetaxel in the setting of very high risk localized disease ho

## 2019-02-15 LAB — GLUCOSE BLDC GLUCOMTR-MCNC: 37 MG/DL (ref 70–99)

## 2019-04-26 ENCOUNTER — TELEPHONE (OUTPATIENT)
Dept: HEMATOLOGY/ONCOLOGY | Facility: HOSPITAL | Age: 84
End: 2019-04-26

## 2019-04-26 NOTE — TELEPHONE ENCOUNTER
Left a message for daughter Latricia Reese asking her to please have her dad do his labs before appointment on monday

## 2019-04-28 ENCOUNTER — APPOINTMENT (OUTPATIENT)
Dept: LAB | Facility: HOSPITAL | Age: 84
End: 2019-04-28
Attending: INTERNAL MEDICINE
Payer: MEDICARE

## 2019-04-28 DIAGNOSIS — C61 MALIGNANT NEOPLASM OF PROSTATE (HCC): ICD-10-CM

## 2019-04-28 PROCEDURE — 84403 ASSAY OF TOTAL TESTOSTERONE: CPT

## 2019-04-28 PROCEDURE — 36415 COLL VENOUS BLD VENIPUNCTURE: CPT

## 2019-04-28 PROCEDURE — 84153 ASSAY OF PSA TOTAL: CPT

## 2019-04-29 ENCOUNTER — OFFICE VISIT (OUTPATIENT)
Dept: HEMATOLOGY/ONCOLOGY | Facility: HOSPITAL | Age: 84
End: 2019-04-29
Attending: INTERNAL MEDICINE
Payer: MEDICARE

## 2019-04-29 VITALS
RESPIRATION RATE: 18 BRPM | BODY MASS INDEX: 29.1 KG/M2 | HEIGHT: 71 IN | HEART RATE: 109 BPM | SYSTOLIC BLOOD PRESSURE: 105 MMHG | TEMPERATURE: 99 F | WEIGHT: 207.88 LBS | DIASTOLIC BLOOD PRESSURE: 90 MMHG

## 2019-04-29 DIAGNOSIS — E10.9 TYPE 1 DIABETES MELLITUS WITHOUT COMPLICATION (HCC): ICD-10-CM

## 2019-04-29 DIAGNOSIS — C61 MALIGNANT NEOPLASM OF PROSTATE (HCC): Primary | ICD-10-CM

## 2019-04-29 DIAGNOSIS — I10 HYPERTENSION, UNSPECIFIED TYPE: ICD-10-CM

## 2019-04-29 PROCEDURE — 99214 OFFICE O/P EST MOD 30 MIN: CPT | Performed by: INTERNAL MEDICINE

## 2019-04-29 NOTE — PROGRESS NOTES
Cancer Center Progress Note    Patient Name: Vazquez Marie   YOB: 1935   Medical Record Number: [id number]   Attending Physician: Mikel Guzman M.D.      Chief Complaint:  Prostate cancer    History of Present Illness:  Cancer history: High blood pressure    • High cholesterol    • Peripheral vascular disease due to secondary diabetes mellitus ([facility]    • Prostate cancer ([facility] 2013 in [facility]       Past Surgical History:  Past Surgical History:   Procedure Laterality Date   • AMPUTATION LO Intimate partner violence:        Fear of current or ex partner: Not on file        Emotionally abused: Not on file        Physically abused: Not on file        Forced sexual activity: Not on file    Other Topics      Concerns:         Service: Not Take 10 mg by mouth., Disp: , Rfl:   •  HYDROcodone-acetaminophen 5-325 MG Oral Tab, Take 1 tablet by mouth., Disp: , Rfl:   •  Insulin Syringe 30G X 5/16\" 1 ML Does not apply Misc, 1 Syringe., Disp: , Rfl:   •  Insulin NPH, Human,, Isophane, 100 UNIT/ML 10/18/2018    BUN 14 10/18/2018    BUNCREA 12.7 10/18/2018    CREATSERUM 1.10 10/18/2018    ANIONGAP 8 10/18/2018    GFRNAA >60 11/09/2018    GFRAA >60 11/09/2018    CA 9.4 10/18/2018    OSMOCALC 292 10/18/2018     10/18/2018    K 4.9 10/18/2018    C

## 2019-07-12 RX ORDER — HOMATROPINE HYDROBROMIDE OPHTHALMIC 50 MG/ML
2 SOLUTION OPHTHALMIC SEE ADMIN INSTRUCTIONS
Status: CANCELLED | OUTPATIENT
Start: 2019-07-15

## 2019-07-12 RX ORDER — TROPICAMIDE 10 MG/ML
2 SOLUTION/ DROPS OPHTHALMIC SEE ADMIN INSTRUCTIONS
Status: CANCELLED | OUTPATIENT
Start: 2019-07-15

## 2019-07-12 RX ORDER — KETOROLAC TROMETHAMINE 5 MG/ML
1 SOLUTION OPHTHALMIC SEE ADMIN INSTRUCTIONS
Status: CANCELLED | OUTPATIENT
Start: 2019-07-15

## 2019-07-12 RX ORDER — PHENYLEPHRINE HCL 2.5 %
2 DROPS OPHTHALMIC (EYE) SEE ADMIN INSTRUCTIONS
Status: CANCELLED | OUTPATIENT
Start: 2019-07-15

## 2019-07-15 ENCOUNTER — ANESTHESIA EVENT (OUTPATIENT)
Dept: SURGERY | Facility: HOSPITAL | Age: 84
End: 2019-07-15
Payer: MEDICARE

## 2019-07-15 ENCOUNTER — HOSPITAL ENCOUNTER (OUTPATIENT)
Facility: HOSPITAL | Age: 84
Setting detail: HOSPITAL OUTPATIENT SURGERY
Discharge: HOME | End: 2019-07-15
Attending: OPHTHALMOLOGY | Admitting: OPHTHALMOLOGY
Payer: MEDICARE

## 2019-07-15 ENCOUNTER — ANESTHESIA (OUTPATIENT)
Dept: SURGERY | Facility: HOSPITAL | Age: 84
End: 2019-07-15
Payer: MEDICARE

## 2019-07-15 VITALS
HEART RATE: 61 BPM | SYSTOLIC BLOOD PRESSURE: 154 MMHG | HEIGHT: 71 IN | DIASTOLIC BLOOD PRESSURE: 60 MMHG | OXYGEN SATURATION: 100 % | BODY MASS INDEX: 28.7 KG/M2 | RESPIRATION RATE: 14 BRPM | WEIGHT: 205 LBS | TEMPERATURE: 97 F

## 2019-07-15 LAB
GLUCOSE BLDC GLUCOMTR-MCNC: 129 MG/DL (ref 70–99)
GLUCOSE BLDC GLUCOMTR-MCNC: 71 MG/DL (ref 70–99)
GLUCOSE BLDC GLUCOMTR-MCNC: 89 MG/DL (ref 70–99)
INR BLD: 2.28 (ref 0.9–1.2)
PROTHROMBIN TIME: 25.4 SECONDS (ref 11.8–14.5)

## 2019-07-15 PROCEDURE — 85610 PROTHROMBIN TIME: CPT | Performed by: OPHTHALMOLOGY

## 2019-07-15 PROCEDURE — 08QE3ZZ REPAIR RIGHT RETINA, PERCUTANEOUS APPROACH: ICD-10-PCS | Performed by: OPHTHALMOLOGY

## 2019-07-15 PROCEDURE — 82962 GLUCOSE BLOOD TEST: CPT

## 2019-07-15 PROCEDURE — 08B43ZZ EXCISION OF RIGHT VITREOUS, PERCUTANEOUS APPROACH: ICD-10-PCS | Performed by: OPHTHALMOLOGY

## 2019-07-15 PROCEDURE — 36415 COLL VENOUS BLD VENIPUNCTURE: CPT | Performed by: OPHTHALMOLOGY

## 2019-07-15 PROCEDURE — 3E0C3GC INTRODUCTION OF OTHER THERAPEUTIC SUBSTANCE INTO EYE, PERCUTANEOUS APPROACH: ICD-10-PCS | Performed by: OPHTHALMOLOGY

## 2019-07-15 PROCEDURE — 08NE3ZZ RELEASE RIGHT RETINA, PERCUTANEOUS APPROACH: ICD-10-PCS | Performed by: OPHTHALMOLOGY

## 2019-07-15 RX ORDER — PHENYLEPHRINE HCL 2.5 %
2 DROPS OPHTHALMIC (EYE) SEE ADMIN INSTRUCTIONS
Status: COMPLETED | OUTPATIENT
Start: 2019-07-15 | End: 2019-07-15

## 2019-07-15 RX ORDER — HYDROCODONE BITARTRATE AND ACETAMINOPHEN 5; 325 MG/1; MG/1
2 TABLET ORAL AS NEEDED
Status: DISCONTINUED | OUTPATIENT
Start: 2019-07-15 | End: 2019-07-15

## 2019-07-15 RX ORDER — MORPHINE SULFATE 10 MG/ML
6 INJECTION, SOLUTION INTRAMUSCULAR; INTRAVENOUS EVERY 10 MIN PRN
Status: DISCONTINUED | OUTPATIENT
Start: 2019-07-15 | End: 2019-07-15

## 2019-07-15 RX ORDER — ONDANSETRON 2 MG/ML
4 INJECTION INTRAMUSCULAR; INTRAVENOUS ONCE AS NEEDED
Status: DISCONTINUED | OUTPATIENT
Start: 2019-07-15 | End: 2019-07-15

## 2019-07-15 RX ORDER — DEXAMETHASONE SODIUM PHOSPHATE 4 MG/ML
VIAL (ML) INJECTION AS NEEDED
Status: DISCONTINUED | OUTPATIENT
Start: 2019-07-15 | End: 2019-07-15 | Stop reason: SURG

## 2019-07-15 RX ORDER — BALANCED SALT SOLUTION 6.4; .75; .48; .3; 3.9; 1.7 MG/ML; MG/ML; MG/ML; MG/ML; MG/ML; MG/ML
SOLUTION OPHTHALMIC AS NEEDED
Status: DISCONTINUED | OUTPATIENT
Start: 2019-07-15 | End: 2019-07-15 | Stop reason: HOSPADM

## 2019-07-15 RX ORDER — DEXTROSE AND SODIUM CHLORIDE 5; .45 G/100ML; G/100ML
INJECTION, SOLUTION INTRAVENOUS CONTINUOUS
Status: DISCONTINUED | OUTPATIENT
Start: 2019-07-15 | End: 2019-07-15

## 2019-07-15 RX ORDER — LIDOCAINE HYDROCHLORIDE 10 MG/ML
INJECTION, SOLUTION EPIDURAL; INFILTRATION; INTRACAUDAL; PERINEURAL AS NEEDED
Status: DISCONTINUED | OUTPATIENT
Start: 2019-07-15 | End: 2019-07-15 | Stop reason: SURG

## 2019-07-15 RX ORDER — FAMOTIDINE 20 MG/1
20 TABLET ORAL ONCE
Status: DISCONTINUED | OUTPATIENT
Start: 2019-07-15 | End: 2019-07-15 | Stop reason: HOSPADM

## 2019-07-15 RX ORDER — HOMATROPINE HYDROBROMIDE OPHTHALMIC 50 MG/ML
2 SOLUTION OPHTHALMIC SEE ADMIN INSTRUCTIONS
Status: COMPLETED | OUTPATIENT
Start: 2019-07-15 | End: 2019-07-15

## 2019-07-15 RX ORDER — DEXTROSE MONOHYDRATE 25 G/50ML
50 INJECTION, SOLUTION INTRAVENOUS
Status: DISCONTINUED | OUTPATIENT
Start: 2019-07-15 | End: 2019-07-15

## 2019-07-15 RX ORDER — KETOROLAC TROMETHAMINE 5 MG/ML
1 SOLUTION OPHTHALMIC SEE ADMIN INSTRUCTIONS
Status: COMPLETED | OUTPATIENT
Start: 2019-07-15 | End: 2019-07-15

## 2019-07-15 RX ORDER — ONDANSETRON 2 MG/ML
INJECTION INTRAMUSCULAR; INTRAVENOUS AS NEEDED
Status: DISCONTINUED | OUTPATIENT
Start: 2019-07-15 | End: 2019-07-15 | Stop reason: SURG

## 2019-07-15 RX ORDER — HYDROMORPHONE HYDROCHLORIDE 1 MG/ML
0.4 INJECTION, SOLUTION INTRAMUSCULAR; INTRAVENOUS; SUBCUTANEOUS EVERY 5 MIN PRN
Status: DISCONTINUED | OUTPATIENT
Start: 2019-07-15 | End: 2019-07-15

## 2019-07-15 RX ORDER — SODIUM CHLORIDE, SODIUM LACTATE, POTASSIUM CHLORIDE, CALCIUM CHLORIDE 600; 310; 30; 20 MG/100ML; MG/100ML; MG/100ML; MG/100ML
INJECTION, SOLUTION INTRAVENOUS CONTINUOUS
Status: DISCONTINUED | OUTPATIENT
Start: 2019-07-15 | End: 2019-07-15

## 2019-07-15 RX ORDER — HYDROCODONE BITARTRATE AND ACETAMINOPHEN 5; 325 MG/1; MG/1
1 TABLET ORAL AS NEEDED
Status: DISCONTINUED | OUTPATIENT
Start: 2019-07-15 | End: 2019-07-15

## 2019-07-15 RX ORDER — DEXAMETHASONE SODIUM PHOSPHATE 4 MG/ML
VIAL (ML) INJECTION AS NEEDED
Status: DISCONTINUED | OUTPATIENT
Start: 2019-07-15 | End: 2019-07-15 | Stop reason: HOSPADM

## 2019-07-15 RX ORDER — NALOXONE HYDROCHLORIDE 0.4 MG/ML
80 INJECTION, SOLUTION INTRAMUSCULAR; INTRAVENOUS; SUBCUTANEOUS AS NEEDED
Status: DISCONTINUED | OUTPATIENT
Start: 2019-07-15 | End: 2019-07-15

## 2019-07-15 RX ORDER — HYDROMORPHONE HYDROCHLORIDE 1 MG/ML
0.2 INJECTION, SOLUTION INTRAMUSCULAR; INTRAVENOUS; SUBCUTANEOUS EVERY 5 MIN PRN
Status: DISCONTINUED | OUTPATIENT
Start: 2019-07-15 | End: 2019-07-15

## 2019-07-15 RX ORDER — MORPHINE SULFATE 4 MG/ML
4 INJECTION, SOLUTION INTRAMUSCULAR; INTRAVENOUS EVERY 10 MIN PRN
Status: DISCONTINUED | OUTPATIENT
Start: 2019-07-15 | End: 2019-07-15

## 2019-07-15 RX ORDER — ACETAMINOPHEN 500 MG
1000 TABLET ORAL ONCE
Status: COMPLETED | OUTPATIENT
Start: 2019-07-15 | End: 2019-07-15

## 2019-07-15 RX ORDER — EPHEDRINE SULFATE 50 MG/ML
INJECTION, SOLUTION INTRAVENOUS AS NEEDED
Status: DISCONTINUED | OUTPATIENT
Start: 2019-07-15 | End: 2019-07-15 | Stop reason: SURG

## 2019-07-15 RX ORDER — HYDROMORPHONE HYDROCHLORIDE 1 MG/ML
0.6 INJECTION, SOLUTION INTRAMUSCULAR; INTRAVENOUS; SUBCUTANEOUS EVERY 5 MIN PRN
Status: DISCONTINUED | OUTPATIENT
Start: 2019-07-15 | End: 2019-07-15

## 2019-07-15 RX ORDER — HYDRALAZINE HYDROCHLORIDE 20 MG/ML
3 INJECTION INTRAMUSCULAR; INTRAVENOUS ONCE
Status: DISCONTINUED | OUTPATIENT
Start: 2019-07-15 | End: 2019-07-15 | Stop reason: ALTCHOICE

## 2019-07-15 RX ORDER — OFLOXACIN 3 MG/ML
SOLUTION/ DROPS OPHTHALMIC AS NEEDED
Status: DISCONTINUED | OUTPATIENT
Start: 2019-07-15 | End: 2019-07-15 | Stop reason: HOSPADM

## 2019-07-15 RX ORDER — MORPHINE SULFATE 2 MG/ML
2 INJECTION, SOLUTION INTRAMUSCULAR; INTRAVENOUS EVERY 10 MIN PRN
Status: DISCONTINUED | OUTPATIENT
Start: 2019-07-15 | End: 2019-07-15

## 2019-07-15 RX ORDER — TROPICAMIDE 10 MG/ML
2 SOLUTION/ DROPS OPHTHALMIC SEE ADMIN INSTRUCTIONS
Status: COMPLETED | OUTPATIENT
Start: 2019-07-15 | End: 2019-07-15

## 2019-07-15 RX ORDER — INDOCYANINE GREEN AND WATER 25 MG
KIT INJECTION AS NEEDED
Status: DISCONTINUED | OUTPATIENT
Start: 2019-07-15 | End: 2019-07-15 | Stop reason: HOSPADM

## 2019-07-15 RX ORDER — PROCHLORPERAZINE EDISYLATE 5 MG/ML
5 INJECTION INTRAMUSCULAR; INTRAVENOUS ONCE AS NEEDED
Status: DISCONTINUED | OUTPATIENT
Start: 2019-07-15 | End: 2019-07-15

## 2019-07-15 RX ORDER — TRIAMCINOLONE ACETONIDE 40 MG/ML
INJECTION, SUSPENSION INTRA-ARTICULAR; INTRAMUSCULAR AS NEEDED
Status: DISCONTINUED | OUTPATIENT
Start: 2019-07-15 | End: 2019-07-15 | Stop reason: HOSPADM

## 2019-07-15 RX ADMIN — SODIUM CHLORIDE, SODIUM LACTATE, POTASSIUM CHLORIDE, CALCIUM CHLORIDE: 600; 310; 30; 20 INJECTION, SOLUTION INTRAVENOUS at 10:05:00

## 2019-07-15 RX ADMIN — LIDOCAINE HYDROCHLORIDE 50 MG: 10 INJECTION, SOLUTION EPIDURAL; INFILTRATION; INTRACAUDAL; PERINEURAL at 10:10:00

## 2019-07-15 RX ADMIN — LIDOCAINE HYDROCHLORIDE 50 MG: 10 INJECTION, SOLUTION EPIDURAL; INFILTRATION; INTRACAUDAL; PERINEURAL at 11:08:00

## 2019-07-15 RX ADMIN — DEXAMETHASONE SODIUM PHOSPHATE 4 MG: 4 MG/ML VIAL (ML) INJECTION at 10:20:00

## 2019-07-15 RX ADMIN — EPHEDRINE SULFATE 5 MG: 50 INJECTION, SOLUTION INTRAVENOUS at 10:37:00

## 2019-07-15 RX ADMIN — EPHEDRINE SULFATE 5 MG: 50 INJECTION, SOLUTION INTRAVENOUS at 10:23:00

## 2019-07-15 RX ADMIN — ONDANSETRON 4 MG: 2 INJECTION INTRAMUSCULAR; INTRAVENOUS at 10:20:00

## 2019-07-15 NOTE — H&P
History & Physical Examination    Name: Evin Doll  Patient ID:  [id number]  Date:  7/15/2019  YOB: 1935 AGE:  80year old SEX:  male      M.D./D.O./D.P.M PERFORMING SURGERY/PROCEDURE:    Edgar Marie MD    Diagnosis: retinal ed done within the last 30 days. Any changes are noted above.   yes    Denise Cheney MD 07/15/19

## 2019-07-15 NOTE — BRIEF OP NOTE
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

## 2019-07-15 NOTE — ANESTHESIA POSTPROCEDURE EVALUATION
Patient: Hi Skinner    Procedure Summary     Date:  07/15/19 Room / Location:  [facility] OR 03 / [facility] OR    Anesthesia Start:  1005 Anesthesia Stop:  3038    Procedure:  EYE VITRECTOMY WITH INDIRECT/ DIRECT LASER/ IRIDEX (Right ) Diagnosis:

## 2019-07-15 NOTE — OPERATIVE REPORT
[facility]    PATIENT'S NAME: Brady Frida   ATTENDING PHYSICIAN: Jasmina Anglin MD   OPERATING PHYSICIAN: Jasmina Anglin MD   PATIENT ACCOUNT#:   [id number]    LOCATION:  [facility] PACU [address] [place] [address]  MEDICAL RECORD #:   [id number]       DATE OF B Dilute ICG dye was injected over the posterior pole and then immediately aspirated, and the internal limiting membrane was then peeled as well. There was noted to be a retinal hole in the superotemporal macula. The edges of the hole were diathermized.   A

## 2019-07-15 NOTE — ANESTHESIA PREPROCEDURE EVALUATION
Anesthesia PreOp Note    HPI:     Hi Skinner is a 80year old male who presents for preoperative consultation requested by:  Jarvis Croft MD    Date of Surgery: 7/15/2019    Procedure(s):  EYE VITRECTOMY WITH INDIRECT/ DIRECT LASER/ IRIDEX  Indic 7/14/2019 at 0900   Insulin NPH, Human,, Isophane, 100 UNIT/ML Subcutaneous Suspension Inject 10 Units into the skin. Disp:  Rfl:  7/14/2019 at 2100   MetFORMIN HCl 850 MG Oral Tab Take 850 mg by mouth 2 (two) times daily with meals.    Disp:  Rfl:  7/14/20 Known Allergies    Family History   Problem Relation Age of Onset   • Hypertension Father    • Diabetes Father    • Hypertension Mother    • Diabetes Mother    • Cancer Mother    • Cancer Sister      Social History    Socioeconomic History      Marital sta Not Asked        Stress Concern: Not Asked        Weight Concern: Not Asked        Special Diet: Not Asked        Back Care: Not Asked        Exercise: Not Asked        Bike Helmet: Not Asked        Seat Belt: Not Asked        Self-Exams: Not Asked    Soci

## 2019-07-15 NOTE — ANESTHESIA PROCEDURE NOTES
Airway  Urgency: elective      General Information and Staff    Patient location during procedure: OR  Anesthesiologist: Yue Aguilar MD  Resident/CRNA: Nya Aguilar CRNA  Performed: anesthesiologist and CRNA     Indications and Patient Condition  Ind

## 2019-10-03 ENCOUNTER — OFFICE VISIT (OUTPATIENT)
Dept: HEMATOLOGY/ONCOLOGY | Facility: HOSPITAL | Age: 84
End: 2019-10-03
Attending: INTERNAL MEDICINE
Payer: MEDICARE

## 2019-10-03 ENCOUNTER — APPOINTMENT (OUTPATIENT)
Dept: LAB | Facility: HOSPITAL | Age: 84
End: 2019-10-03
Attending: INTERNAL MEDICINE
Payer: MEDICARE

## 2019-10-03 VITALS
DIASTOLIC BLOOD PRESSURE: 58 MMHG | OXYGEN SATURATION: 96 % | HEART RATE: 75 BPM | WEIGHT: 206.56 LBS | HEIGHT: 71 IN | TEMPERATURE: 98 F | BODY MASS INDEX: 28.92 KG/M2 | SYSTOLIC BLOOD PRESSURE: 137 MMHG | RESPIRATION RATE: 18 BRPM

## 2019-10-03 DIAGNOSIS — R97.21 RISING PSA FOLLOWING TREATMENT FOR MALIGNANT NEOPLASM OF PROSTATE: ICD-10-CM

## 2019-10-03 DIAGNOSIS — C61 MALIGNANT NEOPLASM OF PROSTATE (HCC): Primary | ICD-10-CM

## 2019-10-03 DIAGNOSIS — I10 HYPERTENSION, UNSPECIFIED TYPE: ICD-10-CM

## 2019-10-03 DIAGNOSIS — C61 MALIGNANT NEOPLASM OF PROSTATE (HCC): ICD-10-CM

## 2019-10-03 DIAGNOSIS — E10.9 TYPE 1 DIABETES MELLITUS WITHOUT COMPLICATION (HCC): ICD-10-CM

## 2019-10-03 PROCEDURE — 84153 ASSAY OF PSA TOTAL: CPT

## 2019-10-03 PROCEDURE — 84403 ASSAY OF TOTAL TESTOSTERONE: CPT

## 2019-10-03 PROCEDURE — 99215 OFFICE O/P EST HI 40 MIN: CPT | Performed by: INTERNAL MEDICINE

## 2019-10-03 PROCEDURE — 36415 COLL VENOUS BLD VENIPUNCTURE: CPT

## 2019-10-03 NOTE — PROGRESS NOTES
Cancer Center Progress Note    Patient Name: Chang Huynh   YOB: 1935   Medical Record Number: [id number]   Attending Physician: Ria Nixon M.D.      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 ([facility]    • Esophageal reflux    • High blood pressure    • High cholesterol    • Peripheral vascular disease due to secondary diabetes mellitus ([facility]    • [facility]       Past Surgic file        Attends Buddhist service: Not on file        Active member of club or organization: Not on file        Attends meetings of clubs or organizations: Not on file        Relationship status: Not on file      Intimate partner violence:        Fear Syringe-Needle U-100 31G X 15/64\" 0.3 ML Does not apply Misc, 1 each., Disp: , Rfl:   •  MetFORMIN HCl 850 MG Oral Tab, Take 850 mg by mouth 2 (two) times daily with meals.   , Disp: , Rfl:   •  Omeprazole 40 MG Oral Capsule Delayed Release, Take 40 mg by patient has [place] 5 + 5 disease, and a PSA of 25.     –Completed XRT 11/2017 also completed greater than 18 months of androgen deprivation therapy with urology  –Discussed the addition of abiraterone or docetaxel in the setting of very high risk localize

## 2019-10-13 ENCOUNTER — HOSPITAL ENCOUNTER (OUTPATIENT)
Dept: CT IMAGING | Facility: HOSPITAL | Age: 84
Discharge: HOME | End: 2019-10-13
Attending: INTERNAL MEDICINE
Payer: MEDICARE

## 2019-10-13 DIAGNOSIS — C61 MALIGNANT NEOPLASM OF PROSTATE (HCC): ICD-10-CM

## 2019-10-13 PROCEDURE — 71260 CT THORAX DX C+: CPT | Performed by: INTERNAL MEDICINE

## 2019-10-13 PROCEDURE — 82565 ASSAY OF CREATININE: CPT

## 2019-10-13 PROCEDURE — 74177 CT ABD & PELVIS W/CONTRAST: CPT | Performed by: INTERNAL MEDICINE

## 2019-11-26 ENCOUNTER — LAB ENCOUNTER (OUTPATIENT)
Dept: LAB | Facility: HOSPITAL | Age: 84
End: 2019-11-26
Attending: INTERNAL MEDICINE
Payer: MEDICARE

## 2019-11-26 DIAGNOSIS — C61 MALIGNANT NEOPLASM OF PROSTATE (HCC): ICD-10-CM

## 2019-11-26 PROCEDURE — 36415 COLL VENOUS BLD VENIPUNCTURE: CPT

## 2019-11-26 PROCEDURE — 84153 ASSAY OF PSA TOTAL: CPT

## 2019-11-26 PROCEDURE — 80053 COMPREHEN METABOLIC PANEL: CPT

## 2019-11-26 PROCEDURE — 85025 COMPLETE CBC W/AUTO DIFF WBC: CPT

## 2019-11-26 PROCEDURE — 84403 ASSAY OF TOTAL TESTOSTERONE: CPT

## 2019-11-27 ENCOUNTER — OFFICE VISIT (OUTPATIENT)
Dept: HEMATOLOGY/ONCOLOGY | Facility: HOSPITAL | Age: 84
End: 2019-11-27
Attending: INTERNAL MEDICINE
Payer: MEDICARE

## 2019-11-27 VITALS
WEIGHT: 208 LBS | BODY MASS INDEX: 29.12 KG/M2 | TEMPERATURE: 99 F | RESPIRATION RATE: 18 BRPM | SYSTOLIC BLOOD PRESSURE: 161 MMHG | HEIGHT: 71 IN | HEART RATE: 69 BPM | OXYGEN SATURATION: 96 % | DIASTOLIC BLOOD PRESSURE: 61 MMHG

## 2019-11-27 DIAGNOSIS — C61 MALIGNANT NEOPLASM OF PROSTATE (HCC): Primary | ICD-10-CM

## 2019-11-27 DIAGNOSIS — E10.9 TYPE 1 DIABETES MELLITUS WITHOUT COMPLICATION (HCC): ICD-10-CM

## 2019-11-27 DIAGNOSIS — C79.51 BONE METASTASIS (HCC): ICD-10-CM

## 2019-11-27 DIAGNOSIS — I10 HYPERTENSION, UNSPECIFIED TYPE: ICD-10-CM

## 2019-11-27 PROCEDURE — 99215 OFFICE O/P EST HI 40 MIN: CPT | Performed by: INTERNAL MEDICINE

## 2019-11-27 NOTE — PROGRESS NOTES
Cancer Center Progress Note    Patient Name: Arnoldo Foster   YOB: 1935   Medical Record Number: [id number]   Attending Physician: Shayy Cohen M.D.      Chief Complaint:  Prostate cancer    History of Present Illness:  Cancer history: Family History:  Family History   Problem Relation Age of Onset   • Hypertension Father    • Diabetes Father    • Hypertension Mother    • Diabetes Mother    • Cancer Mother    • Cancer Sister        Social History:  Social History    Socioeconomic H Hazards: Not Asked        Sleep Concern: Not Asked        Stress Concern: Not Asked        Weight Concern: Not Asked        Special Diet: Not Asked        Back Care: Not Asked        Exercise: Not Asked        Bike Helmet: Not Asked        Seat Belt: Not A Wt 94.3 kg (208 lb)   SpO2 96%   BMI 29.01 kg/m²     Physical Examination:  General: Patient is alert and oriented x 3, not in acute distress. Psych:  Mood and affect appropriate  HEENT: EOMs intact. PERRL. Oropharynx is clear. Neck: No JVD.  No palpable diverticulitis.         Impression and Plan:  14-year-old male with a history of multiple medical problems, including insulin-dependent diabetes mellitus, hypertension, hyperlipidemia, being evaluated for castrate sensitive prostate cancer with likely bone

## 2019-12-03 ENCOUNTER — TELEPHONE (OUTPATIENT)
Dept: HEMATOLOGY/ONCOLOGY | Facility: HOSPITAL | Age: 84
End: 2019-12-03

## 2019-12-03 NOTE — TELEPHONE ENCOUNTER
----- Message from Nader Hua RN sent at 11/29/2019 10:45 AM CST -----  Regarding: Schedule request  Hi,  Can you please schedule patient for lupron x1?   No labs needed, no MD visit needed, just a lupron injection appt for sometime in next week or so

## 2019-12-06 RX ORDER — METOCLOPRAMIDE 10 MG/1
10 TABLET ORAL ONCE
Status: CANCELLED | OUTPATIENT
Start: 2019-12-06 | End: 2019-12-06

## 2019-12-09 ENCOUNTER — HOSPITAL ENCOUNTER (OUTPATIENT)
Facility: HOSPITAL | Age: 84
Setting detail: HOSPITAL OUTPATIENT SURGERY
Discharge: HOME | End: 2019-12-09
Attending: OPHTHALMOLOGY | Admitting: OPHTHALMOLOGY
Payer: MEDICARE

## 2019-12-09 ENCOUNTER — ANESTHESIA (OUTPATIENT)
Dept: SURGERY | Facility: HOSPITAL | Age: 84
End: 2019-12-09
Payer: MEDICARE

## 2019-12-09 ENCOUNTER — ANESTHESIA EVENT (OUTPATIENT)
Dept: SURGERY | Facility: HOSPITAL | Age: 84
End: 2019-12-09
Payer: MEDICARE

## 2019-12-09 VITALS
HEIGHT: 71 IN | HEART RATE: 59 BPM | BODY MASS INDEX: 28.81 KG/M2 | WEIGHT: 205.81 LBS | DIASTOLIC BLOOD PRESSURE: 60 MMHG | SYSTOLIC BLOOD PRESSURE: 130 MMHG | RESPIRATION RATE: 17 BRPM | TEMPERATURE: 98 F | OXYGEN SATURATION: 97 %

## 2019-12-09 PROCEDURE — 08B43ZZ EXCISION OF RIGHT VITREOUS, PERCUTANEOUS APPROACH: ICD-10-PCS | Performed by: OPHTHALMOLOGY

## 2019-12-09 PROCEDURE — 82962 GLUCOSE BLOOD TEST: CPT

## 2019-12-09 PROCEDURE — 08RJ3JZ REPLACEMENT OF RIGHT LENS WITH SYNTHETIC SUBSTITUTE, PERCUTANEOUS APPROACH: ICD-10-PCS | Performed by: OPHTHALMOLOGY

## 2019-12-09 PROCEDURE — 85610 PROTHROMBIN TIME: CPT | Performed by: OPHTHALMOLOGY

## 2019-12-09 DEVICE — IMPLANTABLE DEVICE: Type: IMPLANTABLE DEVICE | Status: FUNCTIONAL

## 2019-12-09 RX ORDER — NALOXONE HYDROCHLORIDE 0.4 MG/ML
80 INJECTION, SOLUTION INTRAMUSCULAR; INTRAVENOUS; SUBCUTANEOUS AS NEEDED
Status: DISCONTINUED | OUTPATIENT
Start: 2019-12-09 | End: 2019-12-09

## 2019-12-09 RX ORDER — MIDAZOLAM HYDROCHLORIDE 1 MG/ML
INJECTION INTRAMUSCULAR; INTRAVENOUS AS NEEDED
Status: DISCONTINUED | OUTPATIENT
Start: 2019-12-09 | End: 2019-12-09 | Stop reason: SURG

## 2019-12-09 RX ORDER — HYDROCODONE BITARTRATE AND ACETAMINOPHEN 5; 325 MG/1; MG/1
2 TABLET ORAL AS NEEDED
Status: DISCONTINUED | OUTPATIENT
Start: 2019-12-09 | End: 2019-12-09

## 2019-12-09 RX ORDER — HALOPERIDOL 5 MG/ML
0.25 INJECTION INTRAMUSCULAR ONCE AS NEEDED
Status: DISCONTINUED | OUTPATIENT
Start: 2019-12-09 | End: 2019-12-09

## 2019-12-09 RX ORDER — DEXAMETHASONE SODIUM PHOSPHATE 4 MG/ML
VIAL (ML) INJECTION AS NEEDED
Status: DISCONTINUED | OUTPATIENT
Start: 2019-12-09 | End: 2019-12-09 | Stop reason: HOSPADM

## 2019-12-09 RX ORDER — MORPHINE SULFATE 4 MG/ML
2 INJECTION, SOLUTION INTRAMUSCULAR; INTRAVENOUS EVERY 10 MIN PRN
Status: DISCONTINUED | OUTPATIENT
Start: 2019-12-09 | End: 2019-12-09

## 2019-12-09 RX ORDER — TROPICAMIDE 10 MG/ML
2 SOLUTION/ DROPS OPHTHALMIC SEE ADMIN INSTRUCTIONS
Status: COMPLETED | OUTPATIENT
Start: 2019-12-09 | End: 2019-12-09

## 2019-12-09 RX ORDER — HYDROCODONE BITARTRATE AND ACETAMINOPHEN 5; 325 MG/1; MG/1
1 TABLET ORAL AS NEEDED
Status: DISCONTINUED | OUTPATIENT
Start: 2019-12-09 | End: 2019-12-09

## 2019-12-09 RX ORDER — HYDROMORPHONE HYDROCHLORIDE 1 MG/ML
0.6 INJECTION, SOLUTION INTRAMUSCULAR; INTRAVENOUS; SUBCUTANEOUS EVERY 5 MIN PRN
Status: DISCONTINUED | OUTPATIENT
Start: 2019-12-09 | End: 2019-12-09

## 2019-12-09 RX ORDER — BALANCED SALT SOLUTION 6.4; .75; .48; .3; 3.9; 1.7 MG/ML; MG/ML; MG/ML; MG/ML; MG/ML; MG/ML
SOLUTION OPHTHALMIC AS NEEDED
Status: DISCONTINUED | OUTPATIENT
Start: 2019-12-09 | End: 2019-12-09 | Stop reason: HOSPADM

## 2019-12-09 RX ORDER — CIPROFLOXACIN HYDROCHLORIDE 3.5 MG/ML
2 SOLUTION/ DROPS TOPICAL SEE ADMIN INSTRUCTIONS
Status: COMPLETED | OUTPATIENT
Start: 2019-12-09 | End: 2019-12-09

## 2019-12-09 RX ORDER — SODIUM CHLORIDE, SODIUM LACTATE, POTASSIUM CHLORIDE, CALCIUM CHLORIDE 600; 310; 30; 20 MG/100ML; MG/100ML; MG/100ML; MG/100ML
INJECTION, SOLUTION INTRAVENOUS CONTINUOUS
Status: DISCONTINUED | OUTPATIENT
Start: 2019-12-09 | End: 2019-12-09

## 2019-12-09 RX ORDER — CIPROFLOXACIN HYDROCHLORIDE 3.5 MG/ML
SOLUTION/ DROPS TOPICAL AS NEEDED
Status: DISCONTINUED | OUTPATIENT
Start: 2019-12-09 | End: 2019-12-09 | Stop reason: HOSPADM

## 2019-12-09 RX ORDER — ONDANSETRON 2 MG/ML
4 INJECTION INTRAMUSCULAR; INTRAVENOUS ONCE AS NEEDED
Status: DISCONTINUED | OUTPATIENT
Start: 2019-12-09 | End: 2019-12-09

## 2019-12-09 RX ORDER — PHENYLEPHRINE HCL 2.5 %
2 DROPS OPHTHALMIC (EYE) SEE ADMIN INSTRUCTIONS
Status: COMPLETED | OUTPATIENT
Start: 2019-12-09 | End: 2019-12-09

## 2019-12-09 RX ORDER — DEXTROSE MONOHYDRATE 25 G/50ML
50 INJECTION, SOLUTION INTRAVENOUS
Status: DISCONTINUED | OUTPATIENT
Start: 2019-12-09 | End: 2019-12-09

## 2019-12-09 RX ORDER — KETOROLAC TROMETHAMINE 5 MG/ML
1 SOLUTION OPHTHALMIC SEE ADMIN INSTRUCTIONS
Status: COMPLETED | OUTPATIENT
Start: 2019-12-09 | End: 2019-12-09

## 2019-12-09 RX ORDER — HYDROMORPHONE HYDROCHLORIDE 1 MG/ML
0.4 INJECTION, SOLUTION INTRAMUSCULAR; INTRAVENOUS; SUBCUTANEOUS EVERY 5 MIN PRN
Status: DISCONTINUED | OUTPATIENT
Start: 2019-12-09 | End: 2019-12-09

## 2019-12-09 RX ORDER — MORPHINE SULFATE 4 MG/ML
4 INJECTION, SOLUTION INTRAMUSCULAR; INTRAVENOUS EVERY 10 MIN PRN
Status: DISCONTINUED | OUTPATIENT
Start: 2019-12-09 | End: 2019-12-09

## 2019-12-09 RX ORDER — HYDROMORPHONE HYDROCHLORIDE 1 MG/ML
0.2 INJECTION, SOLUTION INTRAMUSCULAR; INTRAVENOUS; SUBCUTANEOUS EVERY 5 MIN PRN
Status: DISCONTINUED | OUTPATIENT
Start: 2019-12-09 | End: 2019-12-09

## 2019-12-09 RX ORDER — MORPHINE SULFATE 10 MG/ML
6 INJECTION, SOLUTION INTRAMUSCULAR; INTRAVENOUS EVERY 10 MIN PRN
Status: DISCONTINUED | OUTPATIENT
Start: 2019-12-09 | End: 2019-12-09

## 2019-12-09 RX ORDER — ACETAMINOPHEN 500 MG
1000 TABLET ORAL ONCE
Status: COMPLETED | OUTPATIENT
Start: 2019-12-09 | End: 2019-12-09

## 2019-12-09 RX ORDER — FAMOTIDINE 20 MG/1
20 TABLET ORAL ONCE
Status: DISCONTINUED | OUTPATIENT
Start: 2019-12-09 | End: 2019-12-09 | Stop reason: HOSPADM

## 2019-12-09 RX ORDER — PROCHLORPERAZINE EDISYLATE 5 MG/ML
5 INJECTION INTRAMUSCULAR; INTRAVENOUS ONCE AS NEEDED
Status: DISCONTINUED | OUTPATIENT
Start: 2019-12-09 | End: 2019-12-09

## 2019-12-09 RX ADMIN — MIDAZOLAM HYDROCHLORIDE 2 MG: 1 INJECTION INTRAMUSCULAR; INTRAVENOUS at 11:36:00

## 2019-12-09 RX ADMIN — SODIUM CHLORIDE, SODIUM LACTATE, POTASSIUM CHLORIDE, CALCIUM CHLORIDE: 600; 310; 30; 20 INJECTION, SOLUTION INTRAVENOUS at 12:38:00

## 2019-12-09 NOTE — ANESTHESIA POSTPROCEDURE EVALUATION
Patient: Ashleigh Claudio    Procedure Summary     Date:  12/09/19 Room / Location:  [facility] OR 03 / [facility] OR    Anesthesia Start:  2001 Anesthesia Stop:  1244    Procedure:  EYE VITRECTOMY WITH INDIRECT/ DIRECT LASER/ IRIDEX (Right ) Diagnosis:

## 2019-12-09 NOTE — BRIEF OP NOTE
Pre-Operative Diagnosis: aphakia, vitreous strands right eye     Post-Operative Diagnosis: same    Procedure Performed:   pars plana vitrectomy, scleral fixated intraocular lens insertion right eye    Surgeon(s) and Role:     Leigh Ann Emanuel MD - Primary

## 2019-12-09 NOTE — H&P
History & Physical Examination    Name: Johanna Sidhu  Patient ID:  [id number]  Date:  12/9/2019  YOB: 1935 AGE:  80year old SEX:  male      M.D./D.O./D.P.M PERFORMING SURGERY/PROCEDURE:    Link Dong MD    Diagnosis:  Vitreous discussed the risks and benefits and alternatives with the patient/family. They understand and agree to proceed with the plan of care. Yes    Interval Note:    I have reviewed the History and Physical done within the last 30 days.   Any changes are noted a

## 2019-12-09 NOTE — ANESTHESIA PREPROCEDURE EVALUATION
Anesthesia PreOp Note    HPI:     Kasi Li is a 80year old male who presents for preoperative consultation requested by:  Ever Faust MD    Date of Surgery: 12/9/2019    Procedure(s):  EYE VITRECTOMY WITH INDIRECT/ DIRECT LASER/ IRIDEX  Indic mg by mouth daily.   , Disp: , Rfl: , 12/8/2019 at 2100  Atorvastatin Calcium 10 MG Oral Tab, Take 10 mg by mouth nightly.  , Disp: , Rfl: , 12/8/2019 at 1630  FLUoxetine HCl 10 MG Oral Cap, Take 10 mg by mouth., Disp: , Rfl: , 12/8/2019 at 830  MetFORMIN H 40        Types: Cigarettes      Smokeless tobacco: Never Used      Tobacco comment: quit smoking in his 19's    Substance and Sexual Activity      Alcohol use: No        Alcohol/week: 0.0 standard drinks      Drug use: No      Sexual activity: Not on file  (H) 11/26/2019    PGLU 178 (H) 12/09/2019    CA 9.2 11/26/2019     Lab Results   Component Value Date    INR 1.47 (H) 12/09/2019       Vital Signs: Body mass index is 28.7 kg/m².    height is 1.803 m (5' 11\") and weight is 93.4 kg (205 lb 12.8 oz)

## 2019-12-10 NOTE — OPERATIVE REPORT
[facility]    PATIENT'S NAME: Jairo Galindocy   ATTENDING PHYSICIAN: Erin Ugarte MD   OPERATING PHYSICIAN: Erin Ugarte MD   PATIENT ACCOUNT#:   [id number]    LOCATION:  [address]  MEDICAL RECORD #:   [id number]       DATE OF BIR o'clock meridians with the calipers and 27 gauge trocars were then inserted at these points with a 35-degree angle.   There were noted to be 3 corneal sutures, which were noted to have peripheral corneal neovascularization associated with them, and these we The overlying conjunctiva was closed with 7-0 Vicryl suture and the knots were trimmed. The cannulas were then removed, with the inferotemporal cannula removed last, along with the infusion line. The speculum and drapes were removed.   Operative area was

## 2019-12-17 ENCOUNTER — NURSE ONLY (OUTPATIENT)
Dept: HEMATOLOGY/ONCOLOGY | Facility: HOSPITAL | Age: 84
End: 2019-12-17
Attending: INTERNAL MEDICINE
Payer: MEDICARE

## 2019-12-17 VITALS
HEART RATE: 60 BPM | TEMPERATURE: 98 F | SYSTOLIC BLOOD PRESSURE: 153 MMHG | DIASTOLIC BLOOD PRESSURE: 70 MMHG | OXYGEN SATURATION: 94 % | RESPIRATION RATE: 16 BRPM

## 2019-12-17 DIAGNOSIS — C61 MALIGNANT NEOPLASM OF PROSTATE (HCC): Primary | ICD-10-CM

## 2019-12-17 PROCEDURE — 96402 CHEMO HORMON ANTINEOPL SQ/IM: CPT

## 2019-12-17 NOTE — PROGRESS NOTES
Pt here for Q 3 month Lupron injection. He speaks [address] So does his spouse who is with him. Their granddaughter, 21 yr old Yue Burnett, is here to help translate English/ Hebrew - this is pt's preference, does not want language line, nor does Yue Burnett.

## 2020-01-01 ENCOUNTER — OFFICE VISIT (OUTPATIENT)
Dept: HEMATOLOGY/ONCOLOGY | Facility: HOSPITAL | Age: 85
End: 2020-01-01
Attending: INTERNAL MEDICINE
Payer: MEDICARE

## 2020-01-01 ENCOUNTER — TELEPHONE (OUTPATIENT)
Dept: HEMATOLOGY/ONCOLOGY | Facility: HOSPITAL | Age: 85
End: 2020-01-01

## 2020-01-01 ENCOUNTER — HOSPITAL ENCOUNTER (OUTPATIENT)
Dept: NUCLEAR MEDICINE | Facility: HOSPITAL | Age: 85
Discharge: HOME | End: 2020-01-01
Attending: INTERNAL MEDICINE
Payer: MEDICARE

## 2020-01-01 ENCOUNTER — HOSPITAL ENCOUNTER (OUTPATIENT)
Dept: CT IMAGING | Facility: HOSPITAL | Age: 85
Discharge: HOME | End: 2020-01-01
Attending: INTERNAL MEDICINE
Payer: MEDICARE

## 2020-01-01 VITALS
DIASTOLIC BLOOD PRESSURE: 63 MMHG | SYSTOLIC BLOOD PRESSURE: 148 MMHG | RESPIRATION RATE: 16 BRPM | BODY MASS INDEX: 27.19 KG/M2 | HEIGHT: 71 IN | OXYGEN SATURATION: 96 % | TEMPERATURE: 99 F | WEIGHT: 194.19 LBS | HEART RATE: 67 BPM

## 2020-01-01 VITALS
DIASTOLIC BLOOD PRESSURE: 72 MMHG | WEIGHT: 190 LBS | HEART RATE: 64 BPM | BODY MASS INDEX: 26.6 KG/M2 | HEIGHT: 71 IN | SYSTOLIC BLOOD PRESSURE: 147 MMHG | TEMPERATURE: 99 F | RESPIRATION RATE: 18 BRPM | OXYGEN SATURATION: 96 %

## 2020-01-01 VITALS
OXYGEN SATURATION: 98 % | HEART RATE: 64 BPM | TEMPERATURE: 99 F | RESPIRATION RATE: 16 BRPM | SYSTOLIC BLOOD PRESSURE: 142 MMHG | DIASTOLIC BLOOD PRESSURE: 60 MMHG

## 2020-01-01 DIAGNOSIS — C79.51 BONE METASTASIS (HCC): ICD-10-CM

## 2020-01-01 DIAGNOSIS — C61 MALIGNANT NEOPLASM OF PROSTATE (HCC): ICD-10-CM

## 2020-01-01 DIAGNOSIS — C61 MALIGNANT NEOPLASM OF PROSTATE (HCC): Primary | ICD-10-CM

## 2020-01-01 DIAGNOSIS — I10 HYPERTENSION, UNSPECIFIED TYPE: ICD-10-CM

## 2020-01-01 DIAGNOSIS — Z51.11 CHEMOTHERAPY MANAGEMENT, ENCOUNTER FOR: ICD-10-CM

## 2020-01-01 DIAGNOSIS — R97.21 RISING PSA FOLLOWING TREATMENT FOR MALIGNANT NEOPLASM OF PROSTATE: ICD-10-CM

## 2020-01-01 LAB
ALBUMIN SERPL-MCNC: 3.2 G/DL (ref 3.4–5)
ALBUMIN/GLOB SERPL: 0.8 {RATIO} (ref 1–2)
ALP LIVER SERPL-CCNC: 111 U/L (ref 45–117)
ALT SERPL-CCNC: 14 U/L (ref 16–61)
ANION GAP SERPL CALC-SCNC: 2 MMOL/L (ref 0–18)
AST SERPL-CCNC: 19 U/L (ref 15–37)
BASOPHILS # BLD AUTO: 0.03 X10(3) UL (ref 0–0.2)
BASOPHILS NFR BLD AUTO: 0.5 %
BILIRUB SERPL-MCNC: 0.2 MG/DL (ref 0.1–2)
BUN BLD-MCNC: 13 MG/DL (ref 7–18)
BUN/CREAT SERPL: 13.7 (ref 10–20)
CALCIUM BLD-MCNC: 9.2 MG/DL (ref 8.5–10.1)
CHLORIDE SERPL-SCNC: 107 MMOL/L (ref 98–112)
CO2 SERPL-SCNC: 31 MMOL/L (ref 21–32)
CREAT BLD-MCNC: 0.95 MG/DL (ref 0.7–1.3)
DEPRECATED RDW RBC AUTO: 41.7 FL (ref 35.1–46.3)
EOSINOPHIL # BLD AUTO: 0.14 X10(3) UL (ref 0–0.7)
EOSINOPHIL NFR BLD AUTO: 2.5 %
ERYTHROCYTE [DISTWIDTH] IN BLOOD BY AUTOMATED COUNT: 13.4 % (ref 11–15)
GLOBULIN PLAS-MCNC: 4.2 G/DL (ref 2.8–4.4)
GLUCOSE BLD-MCNC: 208 MG/DL (ref 70–99)
HCT VFR BLD AUTO: 35.1 % (ref 39–53)
HGB BLD-MCNC: 11.6 G/DL (ref 13–17.5)
IMM GRANULOCYTES # BLD AUTO: 0.06 X10(3) UL (ref 0–1)
IMM GRANULOCYTES NFR BLD: 1.1 %
LYMPHOCYTES # BLD AUTO: 1.6 X10(3) UL (ref 1–4)
LYMPHOCYTES NFR BLD AUTO: 28.1 %
M PROTEIN MFR SERPL ELPH: 7.4 G/DL (ref 6.4–8.2)
MCH RBC QN AUTO: 28.4 PG (ref 26–34)
MCHC RBC AUTO-ENTMCNC: 33 G/DL (ref 31–37)
MCV RBC AUTO: 86 FL (ref 80–100)
MONOCYTES # BLD AUTO: 0.48 X10(3) UL (ref 0.1–1)
MONOCYTES NFR BLD AUTO: 8.4 %
NEUTROPHILS # BLD AUTO: 3.38 X10 (3) UL (ref 1.5–7.7)
NEUTROPHILS # BLD AUTO: 3.38 X10(3) UL (ref 1.5–7.7)
NEUTROPHILS NFR BLD AUTO: 59.4 %
OSMOLALITY SERPL CALC.SUM OF ELEC: 296 MOSM/KG (ref 275–295)
PLATELET # BLD AUTO: 209 10(3)UL (ref 150–450)
POTASSIUM SERPL-SCNC: 5 MMOL/L (ref 3.5–5.1)
PSA SERPL-MCNC: 0.14 NG/ML (ref ?–4)
RBC # BLD AUTO: 4.08 X10(6)UL (ref 3.8–5.8)
SODIUM SERPL-SCNC: 140 MMOL/L (ref 136–145)
TESTOST SERPL-MCNC: <7 NG/DL
WBC # BLD AUTO: 5.7 X10(3) UL (ref 4–11)

## 2020-01-01 PROCEDURE — 74176 CT ABD & PELVIS W/O CONTRAST: CPT | Performed by: INTERNAL MEDICINE

## 2020-01-01 PROCEDURE — 36415 COLL VENOUS BLD VENIPUNCTURE: CPT

## 2020-01-01 PROCEDURE — 85025 COMPLETE CBC W/AUTO DIFF WBC: CPT

## 2020-01-01 PROCEDURE — 99215 OFFICE O/P EST HI 40 MIN: CPT | Performed by: INTERNAL MEDICINE

## 2020-01-01 PROCEDURE — 84403 ASSAY OF TOTAL TESTOSTERONE: CPT

## 2020-01-01 PROCEDURE — 96402 CHEMO HORMON ANTINEOPL SQ/IM: CPT

## 2020-01-01 PROCEDURE — 80053 COMPREHEN METABOLIC PANEL: CPT

## 2020-01-01 PROCEDURE — 84153 ASSAY OF PSA TOTAL: CPT

## 2020-01-01 PROCEDURE — 71250 CT THORAX DX C-: CPT | Performed by: INTERNAL MEDICINE

## 2020-01-01 PROCEDURE — 78306 BONE IMAGING WHOLE BODY: CPT | Performed by: INTERNAL MEDICINE

## 2020-01-28 ENCOUNTER — APPOINTMENT (OUTPATIENT)
Dept: HEMATOLOGY/ONCOLOGY | Facility: HOSPITAL | Age: 85
End: 2020-01-28
Attending: INTERNAL MEDICINE
Payer: MEDICARE

## 2020-02-03 ENCOUNTER — APPOINTMENT (OUTPATIENT)
Dept: LAB | Facility: HOSPITAL | Age: 85
End: 2020-02-03
Attending: INTERNAL MEDICINE
Payer: MEDICARE

## 2020-02-03 DIAGNOSIS — C61 MALIGNANT NEOPLASM OF PROSTATE (HCC): ICD-10-CM

## 2020-02-03 LAB
PSA SERPL-MCNC: 1.52 NG/ML (ref ?–4)
TESTOST SERPL-MCNC: <7 NG/DL

## 2020-02-03 PROCEDURE — 36415 COLL VENOUS BLD VENIPUNCTURE: CPT

## 2020-02-03 PROCEDURE — 84153 ASSAY OF PSA TOTAL: CPT

## 2020-02-03 PROCEDURE — 84403 ASSAY OF TOTAL TESTOSTERONE: CPT

## 2020-02-05 ENCOUNTER — OFFICE VISIT (OUTPATIENT)
Dept: HEMATOLOGY/ONCOLOGY | Facility: HOSPITAL | Age: 85
End: 2020-02-05
Attending: INTERNAL MEDICINE
Payer: MEDICARE

## 2020-02-05 ENCOUNTER — TELEPHONE (OUTPATIENT)
Dept: HEMATOLOGY/ONCOLOGY | Facility: HOSPITAL | Age: 85
End: 2020-02-05

## 2020-02-05 VITALS
HEIGHT: 71 IN | BODY MASS INDEX: 28.7 KG/M2 | TEMPERATURE: 98 F | OXYGEN SATURATION: 96 % | SYSTOLIC BLOOD PRESSURE: 142 MMHG | HEART RATE: 58 BPM | RESPIRATION RATE: 18 BRPM | DIASTOLIC BLOOD PRESSURE: 62 MMHG | WEIGHT: 205 LBS

## 2020-02-05 DIAGNOSIS — C79.51 BONE METASTASIS (HCC): ICD-10-CM

## 2020-02-05 DIAGNOSIS — Z51.11 CHEMOTHERAPY MANAGEMENT, ENCOUNTER FOR: ICD-10-CM

## 2020-02-05 DIAGNOSIS — I10 HYPERTENSION, UNSPECIFIED TYPE: ICD-10-CM

## 2020-02-05 DIAGNOSIS — C61 MALIGNANT NEOPLASM OF PROSTATE (HCC): Primary | ICD-10-CM

## 2020-02-05 DIAGNOSIS — E10.9 TYPE 1 DIABETES MELLITUS WITHOUT COMPLICATION (HCC): ICD-10-CM

## 2020-02-05 PROCEDURE — 99215 OFFICE O/P EST HI 40 MIN: CPT | Performed by: INTERNAL MEDICINE

## 2020-02-05 NOTE — PROGRESS NOTES
Cancer Center Progress Note    Patient Name: Carie Brunson   YOB: 1935   Medical Record Number: [id number]   Attending Physician: Scot Pina M.D.      Chief Complaint:  Prostate cancer    History of Present Illness:  Cancer history: INDIRECT/ DIRECT LASER/ IRIDEX Right 12/9/2019    Performed by Ever Faust MD at [facility] MAIN OR   • EYE VITRECTOMY WITH INDIRECT/ DIRECT LASER/ IRIDEX Right 7/15/2019    Performed by Ever Faust MD at [facility] MAIN OR       Family History:  Family History   Prob Topics      Concerns:         Service: Not Asked        Blood Transfusions: Not Asked        Caffeine Concern: Yes          1 cup of coffee daily        Occupational Exposure: Not Asked        Hobby Hazards: Not Asked        Sleep Concern: Not Aske Patient Position: Sitting, Cuff Size: large)   Pulse 58   Temp 98.4 °F (36.9 °C) (Oral)   Resp 18   Ht 1.803 m (5' 11\")   Wt 93 kg (205 lb 0.4 oz)   SpO2 96%   BMI 28.60 kg/m²     Physical Examination:  General: Patient is alert and oriented x 3, not in a indicated, further evaluation with direct visualization could be performed. Hepatic steatosis. Diverticulosis without diverticulitis.         Impression and Plan:  14-year-old male with a history of multiple medical problems, including insulin-depen

## 2020-02-06 ENCOUNTER — APPOINTMENT (OUTPATIENT)
Dept: HEMATOLOGY/ONCOLOGY | Facility: HOSPITAL | Age: 85
End: 2020-02-06
Attending: INTERNAL MEDICINE
Payer: MEDICARE

## 2020-02-10 ENCOUNTER — TELEPHONE (OUTPATIENT)
Dept: HEMATOLOGY/ONCOLOGY | Facility: HOSPITAL | Age: 85
End: 2020-02-10

## 2020-02-10 NOTE — TELEPHONE ENCOUNTER
Starr called and was looking to get clarification on a drug interaction due to prescriptions that were just sent in. She asked that they please get a call back.

## 2020-02-11 NOTE — TELEPHONE ENCOUNTER
Returned call to [employer], they wanted us to be aware of the interactions of the Enzalutamide( it will decrease efficiency) with a number of the patients meds(Clonzapam, Warfain) Called patient daughter and Ty Elizondo NP to let them know that more monit

## 2020-02-20 ENCOUNTER — OFFICE VISIT (OUTPATIENT)
Dept: RADIATION ONCOLOGY | Facility: HOSPITAL | Age: 85
End: 2020-02-20
Attending: RADIOLOGY
Payer: MEDICARE

## 2020-02-20 VITALS
WEIGHT: 205 LBS | BODY MASS INDEX: 28.7 KG/M2 | TEMPERATURE: 98 F | RESPIRATION RATE: 18 BRPM | HEART RATE: 63 BPM | SYSTOLIC BLOOD PRESSURE: 157 MMHG | DIASTOLIC BLOOD PRESSURE: 66 MMHG | HEIGHT: 71 IN

## 2020-02-20 DIAGNOSIS — C79.51 BONE METASTASIS (HCC): Primary | ICD-10-CM

## 2020-02-20 PROCEDURE — 99212 OFFICE O/P EST SF 10 MIN: CPT

## 2020-02-20 NOTE — PROGRESS NOTES
Nursing Consultation Note  Patient: Marybeth Palomares  YOB: 1935  Age: 80year old  Radiation Oncologist: Dr. Obinna Patel  Referring Physician: Zayra Valles  Diagnosis:No diagnosis found.   Consult Date: 2/20/2020      Chemotherapy Insulin Syringe 31G X 5/16\" 0.3 ML Does not apply Misc U UTD BID  3   • aspirin EC 81 MG Oral Tab EC Take 81 mg by mouth daily. • Atorvastatin Calcium 10 MG Oral Tab Take 10 mg by mouth nightly. • Glucose Blood In Vitro Strip 1 strip.      • [place] Marital status:       Spouse name: Not on file      Number of children: Not on file      Years of education: Not on file      Highest education level: Not on file    Occupational History      Not on file    Social Needs      Financial resource strai Self-Exams: Not Asked    Social History Narrative      Not on file      ECOG:  Grade 2 - Ambulatory/capable of all self-care, unable to perform any work activities. Up and about more than 50% of waking hours. Education:  y    Are ADL's met?   Yes  Does Knowledge Deficit Plan Of Care:    Problem:  Knowledge Deficit    Problems related to:    Stereotactic Radiosurgery  Radiation therapy    Interventions:  Show Cyberknife video presentation  Instruct on radiation therapy appointment scheduling  Instruct

## 2020-02-21 NOTE — CONSULTS
[facility]    PATIENT'S NAME: Negra Romo   RADIATION ONCOLOGIST: Marin Clarke.  Lisa Dorantes MD   PATIENT ACCOUNT #: [de-identified] LOCATION: [address] [place] [address] RECORD #: [id number] YOB: 1935   CONSULTATION DATE: 02/20/2020 ablative radiotherapy to what appears to be a sole metastatic site. The patient otherwise continues on treatment with ADT with enzalutamide to be added soon.     The patient is asymptomatic from his metastatic deposit and otherwise is doing reasonably well or lesions. NECK:  Supple with no lymphadenopathy. LUNGS:  Clear to auscultation bilaterally. HEART:  Regular rate and rhythm with a normal S1, S2 and no audible murmurs. LYMPHATICS:  No supraclavicular, axillary, or inguinal lymphadenopathy.   ABDOME treatment to T8. I told him that he should tolerate this treatment well and I do not expect much in the way of significant morbidity. He may have some fatigue and could have some minor back discomfort, but this should resolve fairly quickly.   Long-term o

## 2020-02-25 ENCOUNTER — HOSPITAL ENCOUNTER (OUTPATIENT)
Dept: NUCLEAR MEDICINE | Facility: HOSPITAL | Age: 85
Discharge: HOME | End: 2020-02-25
Attending: INTERNAL MEDICINE
Payer: MEDICARE

## 2020-02-25 DIAGNOSIS — C61 MALIGNANT NEOPLASM OF PROSTATE (HCC): ICD-10-CM

## 2020-02-25 PROCEDURE — 78306 BONE IMAGING WHOLE BODY: CPT | Performed by: INTERNAL MEDICINE

## 2020-02-28 ENCOUNTER — TELEPHONE (OUTPATIENT)
Dept: RADIATION ONCOLOGY | Facility: HOSPITAL | Age: 85
End: 2020-02-28

## 2020-02-28 NOTE — TELEPHONE ENCOUNTER
Bettye Flight daughter made aware of bone scan results. She is aware  Since the pt has no symptoms at this time Dr. Chris Perez will not be treating the pt with RT. If he develops pain to any site of metastases then he will need to be seen and tx at that time.   Marleny Lai

## 2020-03-17 ENCOUNTER — TELEPHONE (OUTPATIENT)
Dept: HEMATOLOGY/ONCOLOGY | Facility: HOSPITAL | Age: 85
End: 2020-03-17

## 2020-03-18 ENCOUNTER — OFFICE VISIT (OUTPATIENT)
Dept: HEMATOLOGY/ONCOLOGY | Facility: HOSPITAL | Age: 85
End: 2020-03-18
Attending: INTERNAL MEDICINE
Payer: MEDICARE

## 2020-03-18 VITALS
OXYGEN SATURATION: 95 % | DIASTOLIC BLOOD PRESSURE: 69 MMHG | HEART RATE: 66 BPM | HEIGHT: 71 IN | SYSTOLIC BLOOD PRESSURE: 152 MMHG | TEMPERATURE: 98 F | RESPIRATION RATE: 18 BRPM | BODY MASS INDEX: 28.73 KG/M2 | WEIGHT: 205.25 LBS

## 2020-03-18 DIAGNOSIS — C61 MALIGNANT NEOPLASM OF PROSTATE (HCC): Primary | ICD-10-CM

## 2020-03-18 DIAGNOSIS — Z51.11 CHEMOTHERAPY MANAGEMENT, ENCOUNTER FOR: ICD-10-CM

## 2020-03-18 DIAGNOSIS — C79.51 BONE METASTASIS (HCC): ICD-10-CM

## 2020-03-18 DIAGNOSIS — E10.9 TYPE 1 DIABETES MELLITUS WITHOUT COMPLICATION (HCC): ICD-10-CM

## 2020-03-18 LAB
ALBUMIN SERPL-MCNC: 3.4 G/DL (ref 3.4–5)
ALBUMIN/GLOB SERPL: 0.9 {RATIO} (ref 1–2)
ALP LIVER SERPL-CCNC: 105 U/L (ref 45–117)
ALT SERPL-CCNC: 18 U/L (ref 16–61)
ANION GAP SERPL CALC-SCNC: 7 MMOL/L (ref 0–18)
AST SERPL-CCNC: 11 U/L (ref 15–37)
BASOPHILS # BLD AUTO: 0.01 X10(3) UL (ref 0–0.2)
BASOPHILS NFR BLD AUTO: 0.2 %
BILIRUB SERPL-MCNC: 0.5 MG/DL (ref 0.1–2)
BUN BLD-MCNC: 12 MG/DL (ref 7–18)
BUN/CREAT SERPL: 14.3 (ref 10–20)
CALCIUM BLD-MCNC: 8.9 MG/DL (ref 8.5–10.1)
CHLORIDE SERPL-SCNC: 104 MMOL/L (ref 98–112)
CO2 SERPL-SCNC: 27 MMOL/L (ref 21–32)
CREAT BLD-MCNC: 0.84 MG/DL (ref 0.7–1.3)
DEPRECATED RDW RBC AUTO: 43.2 FL (ref 35.1–46.3)
EOSINOPHIL # BLD AUTO: 0.07 X10(3) UL (ref 0–0.7)
EOSINOPHIL NFR BLD AUTO: 1.5 %
ERYTHROCYTE [DISTWIDTH] IN BLOOD BY AUTOMATED COUNT: 15.3 % (ref 11–15)
GLOBULIN PLAS-MCNC: 3.9 G/DL (ref 2.8–4.4)
GLUCOSE BLD-MCNC: 249 MG/DL (ref 70–99)
HCT VFR BLD AUTO: 37 % (ref 39–53)
HGB BLD-MCNC: 12.1 G/DL (ref 13–17.5)
IMM GRANULOCYTES # BLD AUTO: 0.01 X10(3) UL (ref 0–1)
IMM GRANULOCYTES NFR BLD: 0.2 %
LYMPHOCYTES # BLD AUTO: 1.4 X10(3) UL (ref 1–4)
LYMPHOCYTES NFR BLD AUTO: 29.7 %
M PROTEIN MFR SERPL ELPH: 7.3 G/DL (ref 6.4–8.2)
MCH RBC QN AUTO: 25.8 PG (ref 26–34)
MCHC RBC AUTO-ENTMCNC: 32.7 G/DL (ref 31–37)
MCV RBC AUTO: 78.9 FL (ref 80–100)
MONOCYTES # BLD AUTO: 0.41 X10(3) UL (ref 0.1–1)
MONOCYTES NFR BLD AUTO: 8.7 %
NEUTROPHILS # BLD AUTO: 2.82 X10 (3) UL (ref 1.5–7.7)
NEUTROPHILS # BLD AUTO: 2.82 X10(3) UL (ref 1.5–7.7)
NEUTROPHILS NFR BLD AUTO: 59.7 %
OSMOLALITY SERPL CALC.SUM OF ELEC: 294 MOSM/KG (ref 275–295)
PATIENT FASTING Y/N/NP: NO
PLATELET # BLD AUTO: 200 10(3)UL (ref 150–450)
POTASSIUM SERPL-SCNC: 4 MMOL/L (ref 3.5–5.1)
PSA SERPL-MCNC: 0.63 NG/ML (ref ?–4)
RBC # BLD AUTO: 4.69 X10(6)UL (ref 3.8–5.8)
SODIUM SERPL-SCNC: 138 MMOL/L (ref 136–145)
TESTOST SERPL-MCNC: <7 NG/DL
WBC # BLD AUTO: 4.7 X10(3) UL (ref 4–11)

## 2020-03-18 PROCEDURE — 85025 COMPLETE CBC W/AUTO DIFF WBC: CPT

## 2020-03-18 PROCEDURE — 96402 CHEMO HORMON ANTINEOPL SQ/IM: CPT

## 2020-03-18 PROCEDURE — 36415 COLL VENOUS BLD VENIPUNCTURE: CPT

## 2020-03-18 PROCEDURE — 80053 COMPREHEN METABOLIC PANEL: CPT

## 2020-03-18 PROCEDURE — 99215 OFFICE O/P EST HI 40 MIN: CPT | Performed by: INTERNAL MEDICINE

## 2020-03-18 PROCEDURE — 84153 ASSAY OF PSA TOTAL: CPT

## 2020-03-18 PROCEDURE — 84403 ASSAY OF TOTAL TESTOSTERONE: CPT

## 2020-03-18 NOTE — PROGRESS NOTES
Cancer Center Progress Note    Patient Name: Amber Marcum   YOB: 1935   Medical Record Number: [id number]   Attending Physician: Mak García M.D.      Chief Complaint:  Prostate cancer    History of Present Illness:  Cancer history: Performed by Kayy Barraza MD at [address] [place] [address] OR   • EYE VITRECTOMY WITH INDIRECT/ DIRECT LASER/ IRIDEX Right 12/9/2019    Performed by Colton Arteaga MD at [address] [place] [address] OR   • EYE VITRECTOMY WITH INDIRECT/ DIRECT LASER/ IRIDEX Right 7/15/2019    Performed by Georgia Hawley abused: Not on file        Forced sexual activity: Not on file    Other Topics      Concerns:         Service: Not Asked        Blood Transfusions: Not Asked        Caffeine Concern: Yes          1 cup of coffee daily        Occupational Exposure: , Rfl:      Allergies:  No Known Allergies     Review of Systems:  All other systems reviewed and negative x12    Vital Signs:  /69 (BP Location: Left arm, Patient Position: Sitting, Cuff Size: large)   Pulse 66   Temp 98.4 °F (36.9 °C) (Oral)   Resp mainstem bronchus. Mild wall thickening of the distal esophagus at the gastroesophageal junction may be related to esophagitis. If clinically indicated, further evaluation with direct visualization could be performed. Hepatic steatosis.      Fanta Acosta

## 2020-03-18 NOTE — PROGRESS NOTES
Pt here for Q 3 month Lupron injection. He [place] Alessandro Mcnulty, medical assistant, to help with translation. He denies any new concerns/ questions. We explained labs/ inj today and then md visit planned - he agreed. Labs drawn easily.

## 2020-04-17 ENCOUNTER — APPOINTMENT (OUTPATIENT)
Dept: HEMATOLOGY/ONCOLOGY | Facility: HOSPITAL | Age: 85
End: 2020-04-17
Attending: INTERNAL MEDICINE
Payer: MEDICARE

## 2020-05-15 ENCOUNTER — APPOINTMENT (OUTPATIENT)
Dept: HEMATOLOGY/ONCOLOGY | Facility: HOSPITAL | Age: 85
End: 2020-05-15
Attending: INTERNAL MEDICINE
Payer: MEDICARE

## 2020-06-17 ENCOUNTER — NURSE ONLY (OUTPATIENT)
Dept: HEMATOLOGY/ONCOLOGY | Facility: HOSPITAL | Age: 85
End: 2020-06-17
Attending: INTERNAL MEDICINE
Payer: MEDICARE

## 2020-06-17 VITALS
SYSTOLIC BLOOD PRESSURE: 153 MMHG | OXYGEN SATURATION: 97 % | TEMPERATURE: 98 F | WEIGHT: 196 LBS | RESPIRATION RATE: 16 BRPM | HEIGHT: 71 IN | DIASTOLIC BLOOD PRESSURE: 73 MMHG | HEART RATE: 75 BPM | BODY MASS INDEX: 27.44 KG/M2

## 2020-06-17 DIAGNOSIS — C61 MALIGNANT NEOPLASM OF PROSTATE (HCC): Primary | ICD-10-CM

## 2020-06-17 DIAGNOSIS — Z51.11 CHEMOTHERAPY MANAGEMENT, ENCOUNTER FOR: ICD-10-CM

## 2020-06-17 DIAGNOSIS — C79.51 BONE METASTASIS (HCC): ICD-10-CM

## 2020-06-17 DIAGNOSIS — E10.9 TYPE 1 DIABETES MELLITUS WITHOUT COMPLICATION (HCC): ICD-10-CM

## 2020-06-17 PROCEDURE — 36415 COLL VENOUS BLD VENIPUNCTURE: CPT

## 2020-06-17 PROCEDURE — 85025 COMPLETE CBC W/AUTO DIFF WBC: CPT

## 2020-06-17 PROCEDURE — 80053 COMPREHEN METABOLIC PANEL: CPT

## 2020-06-17 PROCEDURE — 96402 CHEMO HORMON ANTINEOPL SQ/IM: CPT

## 2020-06-17 PROCEDURE — 84153 ASSAY OF PSA TOTAL: CPT

## 2020-06-17 PROCEDURE — 99215 OFFICE O/P EST HI 40 MIN: CPT | Performed by: INTERNAL MEDICINE

## 2020-06-17 PROCEDURE — 84403 ASSAY OF TOTAL TESTOSTERONE: CPT

## 2020-06-17 NOTE — PROGRESS NOTES
Cancer Center Progress Note    Patient Name: Paulina Wilson   YOB: 1935   Medical Record Number: [id number]   Attending Physician: Stephanie Nguyen M.D.      Chief Complaint:  Prostate cancer    History of Present Illness:  Cancer history: leg   • CYSTOSCOPY N/A 3/15/2017    Performed by Lucila Victoria MD at [facility]   • EYE VITRECTOMY WITH INDIRECT/ DIRECT LASER/ IRIDEX Right 12/9/2019    Performed by Yulissa Gordon MD at [facility]   • EYE VITRECTOMY WITH INDIRECT/ DIRECT LASER/ [place] abused: Not on file        Physically abused: Not on file        Forced sexual activity: Not on file    Other Topics      Concerns:         Service: Not Asked        Blood Transfusions: Not Asked        Caffeine Concern: Yes          1 cup of coffe Tab, Take 2.5 mg by mouth daily.   , Disp: , Rfl:     Allergies:  No Known Allergies     Review of Systems:  All other systems reviewed and negative x12    Vital Signs:  /73 (BP Location: Left arm, Patient Position: Sitting)   Pulse 75   Temp 97.8 °F the trachea and right mainstem bronchus. Mild wall thickening of the distal esophagus at the gastroesophageal junction may be related to esophagitis. If clinically indicated, further evaluation with direct visualization could be performed.      Hepatic

## 2020-06-17 NOTE — PROGRESS NOTES
Pt here for Q 3 month Lupron injection. He [place] Labs drawn easily. 2x2 gauze/ coban wrap to site. Lupron inj administered left gluteal, tolerated well. 2x2 gauze/ paper tape to site.       Discharged stable to lobby with future appts, a

## 2020-06-18 ENCOUNTER — APPOINTMENT (OUTPATIENT)
Dept: HEMATOLOGY/ONCOLOGY | Facility: HOSPITAL | Age: 85
End: 2020-06-18
Attending: INTERNAL MEDICINE
Payer: MEDICARE

## 2020-07-29 ENCOUNTER — NURSE ONLY (OUTPATIENT)
Dept: HEMATOLOGY/ONCOLOGY | Facility: HOSPITAL | Age: 85
End: 2020-07-29
Attending: INTERNAL MEDICINE
Payer: MEDICARE

## 2020-07-29 VITALS
OXYGEN SATURATION: 95 % | BODY MASS INDEX: 26.98 KG/M2 | HEIGHT: 71 IN | WEIGHT: 192.69 LBS | SYSTOLIC BLOOD PRESSURE: 130 MMHG | RESPIRATION RATE: 16 BRPM | DIASTOLIC BLOOD PRESSURE: 57 MMHG | HEART RATE: 67 BPM

## 2020-07-29 DIAGNOSIS — C79.51 BONE METASTASIS (HCC): ICD-10-CM

## 2020-07-29 DIAGNOSIS — I10 HYPERTENSION, UNSPECIFIED TYPE: ICD-10-CM

## 2020-07-29 DIAGNOSIS — C61 MALIGNANT NEOPLASM OF PROSTATE (HCC): ICD-10-CM

## 2020-07-29 DIAGNOSIS — Z51.11 CHEMOTHERAPY MANAGEMENT, ENCOUNTER FOR: ICD-10-CM

## 2020-07-29 DIAGNOSIS — C61 MALIGNANT NEOPLASM OF PROSTATE (HCC): Primary | ICD-10-CM

## 2020-07-29 LAB
ALBUMIN SERPL-MCNC: 3.2 G/DL (ref 3.4–5)
ALBUMIN/GLOB SERPL: 0.8 {RATIO} (ref 1–2)
ALP LIVER SERPL-CCNC: 106 U/L (ref 45–117)
ALT SERPL-CCNC: 16 U/L (ref 16–61)
ANION GAP SERPL CALC-SCNC: 3 MMOL/L (ref 0–18)
AST SERPL-CCNC: 14 U/L (ref 15–37)
BASOPHILS # BLD AUTO: 0.03 X10(3) UL (ref 0–0.2)
BASOPHILS NFR BLD AUTO: 0.6 %
BILIRUB SERPL-MCNC: 0.4 MG/DL (ref 0.1–2)
BUN BLD-MCNC: 13 MG/DL (ref 7–18)
BUN/CREAT SERPL: 13.5 (ref 10–20)
CALCIUM BLD-MCNC: 9 MG/DL (ref 8.5–10.1)
CHLORIDE SERPL-SCNC: 106 MMOL/L (ref 98–112)
CO2 SERPL-SCNC: 31 MMOL/L (ref 21–32)
CREAT BLD-MCNC: 0.96 MG/DL (ref 0.7–1.3)
DEPRECATED RDW RBC AUTO: 40.1 FL (ref 35.1–46.3)
EOSINOPHIL # BLD AUTO: 0.11 X10(3) UL (ref 0–0.7)
EOSINOPHIL NFR BLD AUTO: 2.2 %
ERYTHROCYTE [DISTWIDTH] IN BLOOD BY AUTOMATED COUNT: 13.1 % (ref 11–15)
GLOBULIN PLAS-MCNC: 4 G/DL (ref 2.8–4.4)
GLUCOSE BLD-MCNC: 198 MG/DL (ref 70–99)
HCT VFR BLD AUTO: 35.7 % (ref 39–53)
HGB BLD-MCNC: 12.1 G/DL (ref 13–17.5)
IMM GRANULOCYTES # BLD AUTO: 0.04 X10(3) UL (ref 0–1)
IMM GRANULOCYTES NFR BLD: 0.8 %
LYMPHOCYTES # BLD AUTO: 1.6 X10(3) UL (ref 1–4)
LYMPHOCYTES NFR BLD AUTO: 31.3 %
M PROTEIN MFR SERPL ELPH: 7.2 G/DL (ref 6.4–8.2)
MCH RBC QN AUTO: 28.9 PG (ref 26–34)
MCHC RBC AUTO-ENTMCNC: 33.9 G/DL (ref 31–37)
MCV RBC AUTO: 85.2 FL (ref 80–100)
MONOCYTES # BLD AUTO: 0.46 X10(3) UL (ref 0.1–1)
MONOCYTES NFR BLD AUTO: 9 %
NEUTROPHILS # BLD AUTO: 2.87 X10 (3) UL (ref 1.5–7.7)
NEUTROPHILS # BLD AUTO: 2.87 X10(3) UL (ref 1.5–7.7)
NEUTROPHILS NFR BLD AUTO: 56.1 %
OSMOLALITY SERPL CALC.SUM OF ELEC: 296 MOSM/KG (ref 275–295)
PATIENT FASTING Y/N/NP: NO
PLATELET # BLD AUTO: 190 10(3)UL (ref 150–450)
POTASSIUM SERPL-SCNC: 4.5 MMOL/L (ref 3.5–5.1)
PSA SERPL-MCNC: 0.13 NG/ML (ref ?–4)
RBC # BLD AUTO: 4.19 X10(6)UL (ref 3.8–5.8)
SODIUM SERPL-SCNC: 140 MMOL/L (ref 136–145)
WBC # BLD AUTO: 5.1 X10(3) UL (ref 4–11)

## 2020-07-29 PROCEDURE — 36415 COLL VENOUS BLD VENIPUNCTURE: CPT

## 2020-07-29 PROCEDURE — 85025 COMPLETE CBC W/AUTO DIFF WBC: CPT

## 2020-07-29 PROCEDURE — 84153 ASSAY OF PSA TOTAL: CPT

## 2020-07-29 PROCEDURE — 80053 COMPREHEN METABOLIC PANEL: CPT

## 2020-07-29 PROCEDURE — 99215 OFFICE O/P EST HI 40 MIN: CPT | Performed by: INTERNAL MEDICINE

## 2020-07-29 NOTE — PROGRESS NOTES
Cancer Center Progress Note    Patient Name: José Mancia   YOB: 1935   Medical Record Number: [id number]   Attending Physician: Mica Holm M.D.      Chief Complaint:  Prostate cancer    History of Present Illness:  Cancer history: amputation   • CATH PERIPHERAL STENT Right     lower leg   • CYSTOSCOPY N/A 3/15/2017    Performed by Davida Storey [facility] at [facility]   • EYE VITRECTOMY WITH INDIRECT/ DIRECT LASER/ IRIDEX Right 12/9/2019    Performed by Freeman Saul MD at [facility] current or ex partner: Not on file        Emotionally abused: Not on file        Physically abused: Not on file        Forced sexual activity: Not on file    Other Topics      Concerns:         Service: Not Asked        Blood Transfusions: Not Aske mouth., Disp: , Rfl:   •  Warfarin Sodium 2.5 MG Oral Tab, Take 2.5 mg by mouth daily.   , Disp: , Rfl:     Allergies:  No Known Allergies     Review of Systems:  All other systems reviewed and negative x12    Vital Signs:  /57 (BP Location: Left arm, amounts of retained secretions within the trachea and right mainstem bronchus. Mild wall thickening of the distal esophagus at the gastroesophageal junction may be related to esophagitis.  If clinically indicated, further evaluation with direct visualiz

## 2020-09-18 NOTE — PROGRESS NOTES
Labs drawn from [facility] then sent to lab. Kristi Curtis MA here to explain to patient in [address] [facility] switch over to Elig[place] due to shortage of Lupron. Patient verbalized understanding. Eligard given Sq in Left lower abdomen. Tolerated injection well.   Reba Sawyer

## 2020-09-18 NOTE — PROGRESS NOTES
Cancer Center Progress Note    Patient Name: Doroteo Edwards   YOB: 1935   Medical Record Number: [id number]   Attending Physician: Maria E Flores M.D.      Chief Complaint:  Prostate cancer    History of Present Illness:  Cancer history: LEG,CIRCULAR Left     below the knee amputation   • CATH PERIPHERAL STENT Right     lower leg   • CYSTOSCOPY N/A 3/15/2017    Performed by Kayy Barraza MD at [address] [place] [address] MAIN OR   • EYE VITRECTOMY WITH INDIRECT/ DIRECT LASER/ IRIDEX Right 12/9/2019    Performed partner violence:        Fear of current or ex partner: Not on file        Emotionally abused: Not on file        Physically abused: Not on file        Forced sexual activity: Not on file    Other Topics      Concerns:         Service: Not Asked Delayed Release, Take 40 mg by mouth., Disp: , Rfl:   •  Warfarin Sodium 2.5 MG Oral Tab, Take 2.5 mg by mouth daily.   , Disp: , Rfl:     Allergies:  No Known Allergies     Review of Systems:  All other systems reviewed and negative x12    Vital Signs:  BP chest CT recommended in 6 months to demonstrate   resolution. Small amounts of retained secretions within the trachea and right mainstem bronchus.      Mild wall thickening of the distal esophagus at the gastroesophageal junction may be related to eso

## 2020-11-20 NOTE — PROGRESS NOTES
Cancer Center Progress Note    Patient Name: Glory Schmitt   YOB: 1935   Medical Record Number: [id number]   Attending Physician: Kari Rothman M.D.      Chief Complaint:  Prostate cancer    History of Present Illness:  Cancer history: Procedure Laterality Date   • AMPUTATION LOW LEG,CIRCULAR Left     below the knee amputation   • CATH PERIPHERAL STENT Right     lower leg   • CYSTOSCOPY N/A 3/15/2017    Performed by Jose Francisco Doyle MD at [facility] OR   • EYE VITRECTOMY WITH INDIRECT/ DI Relationship status: Not on file      Intimate partner violence        Fear of current or ex partner: Not on file        Emotionally abused: Not on file        Physically abused: Not on file        Forced sexual activity: Not on file    Other Topics      C Disp: , Rfl:   •  Omeprazole 40 MG Oral Capsule Delayed Release, Take 40 mg by mouth., Disp: , Rfl:   •  Warfarin Sodium 2.5 MG Oral Tab, Take 2.5 mg by mouth daily.   , Disp: , Rfl:     Allergies:  No Known Allergies     Review of Systems:  All other syste sequela inflammation or atelectasis. Followup chest CT recommended in 6 months to demonstrate   resolution. Small amounts of retained secretions within the trachea and right mainstem bronchus.      Mild wall thickening of the distal esophagus at the g

## 2021-01-01 ENCOUNTER — OFFICE VISIT (OUTPATIENT)
Dept: RADIATION ONCOLOGY | Facility: HOSPITAL | Age: 86
End: 2021-01-01
Attending: RADIOLOGY
Payer: MEDICARE

## 2021-01-01 ENCOUNTER — OFFICE VISIT (OUTPATIENT)
Dept: HEMATOLOGY/ONCOLOGY | Facility: HOSPITAL | Age: 86
End: 2021-01-01
Attending: INTERNAL MEDICINE
Payer: MEDICARE

## 2021-01-01 ENCOUNTER — TELEPHONE (OUTPATIENT)
Dept: RADIATION ONCOLOGY | Facility: HOSPITAL | Age: 86
End: 2021-01-01

## 2021-01-01 ENCOUNTER — APPOINTMENT (OUTPATIENT)
Dept: GENERAL RADIOLOGY | Facility: HOSPITAL | Age: 86
End: 2021-01-01
Attending: EMERGENCY MEDICINE
Payer: MEDICARE

## 2021-01-01 ENCOUNTER — OFFICE VISIT (OUTPATIENT)
Dept: HEMATOLOGY/ONCOLOGY | Facility: HOSPITAL | Age: 86
End: 2021-01-01
Attending: NURSE PRACTITIONER
Payer: MEDICARE

## 2021-01-01 ENCOUNTER — HOSPITAL ENCOUNTER (OUTPATIENT)
Dept: NUCLEAR MEDICINE | Facility: HOSPITAL | Age: 86
Discharge: HOME | End: 2021-01-01
Attending: RADIOLOGY
Payer: MEDICARE

## 2021-01-01 ENCOUNTER — TELEPHONE (OUTPATIENT)
Dept: HEMATOLOGY/ONCOLOGY | Facility: HOSPITAL | Age: 86
End: 2021-01-01

## 2021-01-01 ENCOUNTER — IMMUNIZATION (OUTPATIENT)
Dept: LAB | Age: 86
End: 2021-01-01
Attending: HOSPITALIST
Payer: MEDICARE

## 2021-01-01 ENCOUNTER — LAB REQUISITION (OUTPATIENT)
Dept: LAB | Facility: HOSPITAL | Age: 86
End: 2021-01-01
Payer: MEDICARE

## 2021-01-01 ENCOUNTER — HOSPITAL ENCOUNTER (OUTPATIENT)
Dept: CT IMAGING | Facility: HOSPITAL | Age: 86
Discharge: HOME | End: 2021-01-01
Attending: NURSE PRACTITIONER
Payer: MEDICARE

## 2021-01-01 ENCOUNTER — APPOINTMENT (OUTPATIENT)
Dept: CT IMAGING | Facility: HOSPITAL | Age: 86
End: 2021-01-01
Attending: EMERGENCY MEDICINE
Payer: MEDICARE

## 2021-01-01 ENCOUNTER — SOCIAL WORK SERVICES (OUTPATIENT)
Dept: HEMATOLOGY/ONCOLOGY | Facility: HOSPITAL | Age: 86
End: 2021-01-01

## 2021-01-01 ENCOUNTER — LAB ENCOUNTER (OUTPATIENT)
Dept: LAB | Facility: HOSPITAL | Age: 86
End: 2021-01-01
Attending: ANESTHESIOLOGY
Payer: MEDICARE

## 2021-01-01 ENCOUNTER — APPOINTMENT (OUTPATIENT)
Dept: GENERAL RADIOLOGY | Facility: HOSPITAL | Age: 86
DRG: 082 | End: 2021-01-01
Attending: EMERGENCY MEDICINE
Payer: MEDICARE

## 2021-01-01 ENCOUNTER — HOSPITAL ENCOUNTER (EMERGENCY)
Facility: HOSPITAL | Age: 86
Discharge: HOME | End: 2021-01-01
Attending: EMERGENCY MEDICINE
Payer: MEDICARE

## 2021-01-01 ENCOUNTER — HOSPITAL ENCOUNTER (OUTPATIENT)
Dept: NUCLEAR MEDICINE | Facility: HOSPITAL | Age: 86
Discharge: HOME | End: 2021-01-01
Attending: NURSE PRACTITIONER
Payer: MEDICARE

## 2021-01-01 ENCOUNTER — LAB ENCOUNTER (OUTPATIENT)
Dept: LAB | Facility: HOSPITAL | Age: 86
DRG: 082 | End: 2021-01-01
Attending: INTERNAL MEDICINE
Payer: MEDICARE

## 2021-01-01 ENCOUNTER — DOCUMENTATION ONLY (OUTPATIENT)
Dept: RADIATION ONCOLOGY | Facility: HOSPITAL | Age: 86
End: 2021-01-01

## 2021-01-01 ENCOUNTER — APPOINTMENT (OUTPATIENT)
Dept: CT IMAGING | Facility: HOSPITAL | Age: 86
DRG: 082 | End: 2021-01-01
Attending: EMERGENCY MEDICINE
Payer: MEDICARE

## 2021-01-01 ENCOUNTER — HOSPITAL ENCOUNTER (INPATIENT)
Facility: HOSPITAL | Age: 86
LOS: 1 days | DRG: 082 | End: 2021-01-01
Attending: EMERGENCY MEDICINE | Admitting: INTERNAL MEDICINE
Payer: MEDICARE

## 2021-01-01 VITALS
HEIGHT: 71 IN | RESPIRATION RATE: 18 BRPM | HEART RATE: 69 BPM | TEMPERATURE: 98 F | SYSTOLIC BLOOD PRESSURE: 158 MMHG | BODY MASS INDEX: 24.5 KG/M2 | DIASTOLIC BLOOD PRESSURE: 62 MMHG | OXYGEN SATURATION: 98 % | WEIGHT: 175 LBS

## 2021-01-01 VITALS
TEMPERATURE: 99 F | RESPIRATION RATE: 18 BRPM | DIASTOLIC BLOOD PRESSURE: 58 MMHG | HEART RATE: 87 BPM | SYSTOLIC BLOOD PRESSURE: 117 MMHG | OXYGEN SATURATION: 97 %

## 2021-01-01 VITALS
RESPIRATION RATE: 18 BRPM | OXYGEN SATURATION: 97 % | HEART RATE: 87 BPM | DIASTOLIC BLOOD PRESSURE: 54 MMHG | SYSTOLIC BLOOD PRESSURE: 115 MMHG | HEART RATE: 67 BPM | SYSTOLIC BLOOD PRESSURE: 148 MMHG | WEIGHT: 187 LBS | DIASTOLIC BLOOD PRESSURE: 82 MMHG | WEIGHT: 175 LBS | BODY MASS INDEX: 26.6 KG/M2 | BODY MASS INDEX: 24.5 KG/M2 | RESPIRATION RATE: 18 BRPM | HEIGHT: 71 IN | DIASTOLIC BLOOD PRESSURE: 75 MMHG | SYSTOLIC BLOOD PRESSURE: 117 MMHG | HEART RATE: 74 BPM | DIASTOLIC BLOOD PRESSURE: 58 MMHG | OXYGEN SATURATION: 96 % | BODY MASS INDEX: 26.18 KG/M2 | OXYGEN SATURATION: 96 % | RESPIRATION RATE: 18 BRPM | TEMPERATURE: 99 F | RESPIRATION RATE: 18 BRPM | HEIGHT: 71 IN | WEIGHT: 190 LBS | TEMPERATURE: 99 F | HEART RATE: 103 BPM | OXYGEN SATURATION: 100 % | TEMPERATURE: 99 F | HEIGHT: 70.98 IN | SYSTOLIC BLOOD PRESSURE: 143 MMHG

## 2021-01-01 VITALS
BODY MASS INDEX: 25.62 KG/M2 | WEIGHT: 183 LBS | HEIGHT: 71 IN | TEMPERATURE: 99 F | DIASTOLIC BLOOD PRESSURE: 67 MMHG | RESPIRATION RATE: 18 BRPM | SYSTOLIC BLOOD PRESSURE: 150 MMHG | OXYGEN SATURATION: 98 % | HEART RATE: 64 BPM

## 2021-01-01 VITALS
DIASTOLIC BLOOD PRESSURE: 62 MMHG | SYSTOLIC BLOOD PRESSURE: 168 MMHG | OXYGEN SATURATION: 99 % | HEART RATE: 62 BPM | BODY MASS INDEX: 24.5 KG/M2 | RESPIRATION RATE: 18 BRPM | TEMPERATURE: 98 F | HEIGHT: 71 IN | WEIGHT: 175 LBS

## 2021-01-01 VITALS
WEIGHT: 166.44 LBS | RESPIRATION RATE: 16 BRPM | DIASTOLIC BLOOD PRESSURE: 67 MMHG | TEMPERATURE: 99 F | OXYGEN SATURATION: 100 % | BODY MASS INDEX: 27 KG/M2 | HEART RATE: 128 BPM | SYSTOLIC BLOOD PRESSURE: 93 MMHG

## 2021-01-01 VITALS
RESPIRATION RATE: 18 BRPM | BODY MASS INDEX: 24 KG/M2 | HEIGHT: 71 IN | SYSTOLIC BLOOD PRESSURE: 148 MMHG | HEART RATE: 67 BPM | OXYGEN SATURATION: 97 % | TEMPERATURE: 98 F | DIASTOLIC BLOOD PRESSURE: 65 MMHG

## 2021-01-01 VITALS
TEMPERATURE: 98 F | DIASTOLIC BLOOD PRESSURE: 72 MMHG | OXYGEN SATURATION: 98 % | SYSTOLIC BLOOD PRESSURE: 138 MMHG | HEART RATE: 91 BPM | RESPIRATION RATE: 18 BRPM

## 2021-01-01 VITALS
HEART RATE: 65 BPM | BODY MASS INDEX: 25 KG/M2 | RESPIRATION RATE: 18 BRPM | OXYGEN SATURATION: 98 % | WEIGHT: 182 LBS | TEMPERATURE: 98 F | SYSTOLIC BLOOD PRESSURE: 143 MMHG | DIASTOLIC BLOOD PRESSURE: 54 MMHG

## 2021-01-01 VITALS
DIASTOLIC BLOOD PRESSURE: 62 MMHG | BODY MASS INDEX: 24.5 KG/M2 | WEIGHT: 175 LBS | TEMPERATURE: 98 F | HEART RATE: 69 BPM | HEIGHT: 71 IN | OXYGEN SATURATION: 98 % | RESPIRATION RATE: 18 BRPM | SYSTOLIC BLOOD PRESSURE: 158 MMHG

## 2021-01-01 VITALS — SYSTOLIC BLOOD PRESSURE: 218 MMHG | DIASTOLIC BLOOD PRESSURE: 90 MMHG | HEART RATE: 96 BPM

## 2021-01-01 VITALS
TEMPERATURE: 98 F | RESPIRATION RATE: 18 BRPM | HEIGHT: 71 IN | SYSTOLIC BLOOD PRESSURE: 140 MMHG | WEIGHT: 182 LBS | DIASTOLIC BLOOD PRESSURE: 53 MMHG | HEART RATE: 72 BPM | BODY MASS INDEX: 25.48 KG/M2 | OXYGEN SATURATION: 98 %

## 2021-01-01 VITALS
RESPIRATION RATE: 18 BRPM | WEIGHT: 183 LBS | HEIGHT: 71 IN | HEART RATE: 78 BPM | SYSTOLIC BLOOD PRESSURE: 128 MMHG | OXYGEN SATURATION: 97 % | BODY MASS INDEX: 25.62 KG/M2 | TEMPERATURE: 98 F | DIASTOLIC BLOOD PRESSURE: 60 MMHG

## 2021-01-01 VITALS
RESPIRATION RATE: 18 BRPM | OXYGEN SATURATION: 98 % | DIASTOLIC BLOOD PRESSURE: 73 MMHG | SYSTOLIC BLOOD PRESSURE: 177 MMHG | TEMPERATURE: 97 F | HEART RATE: 68 BPM

## 2021-01-01 VITALS
OXYGEN SATURATION: 96 % | TEMPERATURE: 99 F | HEART RATE: 97 BPM | DIASTOLIC BLOOD PRESSURE: 72 MMHG | SYSTOLIC BLOOD PRESSURE: 161 MMHG | RESPIRATION RATE: 15 BRPM

## 2021-01-01 VITALS
RESPIRATION RATE: 16 BRPM | TEMPERATURE: 99 F | DIASTOLIC BLOOD PRESSURE: 71 MMHG | OXYGEN SATURATION: 98 % | SYSTOLIC BLOOD PRESSURE: 156 MMHG | HEART RATE: 71 BPM

## 2021-01-01 VITALS
HEART RATE: 92 BPM | OXYGEN SATURATION: 98 % | SYSTOLIC BLOOD PRESSURE: 132 MMHG | RESPIRATION RATE: 18 BRPM | DIASTOLIC BLOOD PRESSURE: 72 MMHG | TEMPERATURE: 98 F

## 2021-01-01 VITALS
DIASTOLIC BLOOD PRESSURE: 77 MMHG | OXYGEN SATURATION: 98 % | SYSTOLIC BLOOD PRESSURE: 146 MMHG | HEART RATE: 66 BPM | HEIGHT: 71 IN | DIASTOLIC BLOOD PRESSURE: 58 MMHG | RESPIRATION RATE: 18 BRPM | SYSTOLIC BLOOD PRESSURE: 187 MMHG | TEMPERATURE: 98 F | OXYGEN SATURATION: 96 % | BODY MASS INDEX: 25.9 KG/M2 | HEART RATE: 82 BPM | TEMPERATURE: 97 F | RESPIRATION RATE: 18 BRPM | WEIGHT: 185 LBS

## 2021-01-01 VITALS
RESPIRATION RATE: 18 BRPM | DIASTOLIC BLOOD PRESSURE: 75 MMHG | BODY MASS INDEX: 27 KG/M2 | OXYGEN SATURATION: 96 % | WEIGHT: 190 LBS | HEART RATE: 67 BPM | TEMPERATURE: 99 F | SYSTOLIC BLOOD PRESSURE: 148 MMHG

## 2021-01-01 VITALS
SYSTOLIC BLOOD PRESSURE: 148 MMHG | RESPIRATION RATE: 16 BRPM | HEART RATE: 91 BPM | TEMPERATURE: 98 F | OXYGEN SATURATION: 98 % | DIASTOLIC BLOOD PRESSURE: 67 MMHG

## 2021-01-01 VITALS
OXYGEN SATURATION: 96 % | BODY MASS INDEX: 30.82 KG/M2 | TEMPERATURE: 97 F | SYSTOLIC BLOOD PRESSURE: 142 MMHG | HEART RATE: 89 BPM | DIASTOLIC BLOOD PRESSURE: 59 MMHG | RESPIRATION RATE: 14 BRPM | HEIGHT: 66 IN | WEIGHT: 191.81 LBS

## 2021-01-01 VITALS
OXYGEN SATURATION: 97 % | HEART RATE: 99 BPM | DIASTOLIC BLOOD PRESSURE: 72 MMHG | SYSTOLIC BLOOD PRESSURE: 169 MMHG | RESPIRATION RATE: 18 BRPM | TEMPERATURE: 98 F

## 2021-01-01 VITALS
SYSTOLIC BLOOD PRESSURE: 211 MMHG | HEART RATE: 90 BPM | TEMPERATURE: 100 F | DIASTOLIC BLOOD PRESSURE: 89 MMHG | OXYGEN SATURATION: 99 %

## 2021-01-01 VITALS
BODY MASS INDEX: 26.18 KG/M2 | WEIGHT: 187 LBS | RESPIRATION RATE: 18 BRPM | OXYGEN SATURATION: 96 % | HEART RATE: 74 BPM | SYSTOLIC BLOOD PRESSURE: 115 MMHG | HEIGHT: 71 IN | TEMPERATURE: 99 F | DIASTOLIC BLOOD PRESSURE: 54 MMHG

## 2021-01-01 VITALS
DIASTOLIC BLOOD PRESSURE: 62 MMHG | RESPIRATION RATE: 18 BRPM | BODY MASS INDEX: 24.5 KG/M2 | TEMPERATURE: 98 F | WEIGHT: 175 LBS | HEIGHT: 71 IN | SYSTOLIC BLOOD PRESSURE: 168 MMHG | OXYGEN SATURATION: 99 % | HEART RATE: 62 BPM

## 2021-01-01 VITALS
SYSTOLIC BLOOD PRESSURE: 160 MMHG | TEMPERATURE: 99 F | RESPIRATION RATE: 18 BRPM | OXYGEN SATURATION: 99 % | DIASTOLIC BLOOD PRESSURE: 61 MMHG | HEART RATE: 70 BPM

## 2021-01-01 VITALS
WEIGHT: 185.19 LBS | DIASTOLIC BLOOD PRESSURE: 83 MMHG | TEMPERATURE: 99 F | SYSTOLIC BLOOD PRESSURE: 162 MMHG | OXYGEN SATURATION: 93 % | BODY MASS INDEX: 26 KG/M2 | RESPIRATION RATE: 22 BRPM | HEART RATE: 74 BPM

## 2021-01-01 DIAGNOSIS — C79.51 BONE METASTASIS (HCC): ICD-10-CM

## 2021-01-01 DIAGNOSIS — C79.51 BONE METASTASIS (HCC): Primary | ICD-10-CM

## 2021-01-01 DIAGNOSIS — C61 MALIGNANT NEOPLASM OF PROSTATE (HCC): ICD-10-CM

## 2021-01-01 DIAGNOSIS — T40.2X5A THERAPEUTIC OPIOID INDUCED CONSTIPATION: ICD-10-CM

## 2021-01-01 DIAGNOSIS — R29.6 FREQUENT FALLS: ICD-10-CM

## 2021-01-01 DIAGNOSIS — Z51.11 CHEMOTHERAPY MANAGEMENT, ENCOUNTER FOR: Primary | ICD-10-CM

## 2021-01-01 DIAGNOSIS — C61 MALIGNANT NEOPLASM OF PROSTATE (HCC): Primary | ICD-10-CM

## 2021-01-01 DIAGNOSIS — R11.0 NAUSEA: ICD-10-CM

## 2021-01-01 DIAGNOSIS — Z71.89 GOALS OF CARE, COUNSELING/DISCUSSION: ICD-10-CM

## 2021-01-01 DIAGNOSIS — D50.8 OTHER IRON DEFICIENCY ANEMIA: Primary | ICD-10-CM

## 2021-01-01 DIAGNOSIS — R79.1 SUPRATHERAPEUTIC INR: ICD-10-CM

## 2021-01-01 DIAGNOSIS — R53.1 WEAKNESS GENERALIZED: Primary | ICD-10-CM

## 2021-01-01 DIAGNOSIS — Z23 NEED FOR VACCINATION: Primary | ICD-10-CM

## 2021-01-01 DIAGNOSIS — D64.9 ANEMIA, UNSPECIFIED TYPE: Primary | ICD-10-CM

## 2021-01-01 DIAGNOSIS — S88.119A BELOW-KNEE AMPUTATION (HCC): ICD-10-CM

## 2021-01-01 DIAGNOSIS — G89.3 CANCER ASSOCIATED PAIN: ICD-10-CM

## 2021-01-01 DIAGNOSIS — G89.3 CANCER RELATED PAIN: Primary | ICD-10-CM

## 2021-01-01 DIAGNOSIS — R11.2 NON-INTRACTABLE VOMITING WITH NAUSEA, UNSPECIFIED VOMITING TYPE: ICD-10-CM

## 2021-01-01 DIAGNOSIS — M54.9 BACK PAIN WITHOUT RADIATION: Primary | ICD-10-CM

## 2021-01-01 DIAGNOSIS — R97.21 RISING PSA FOLLOWING TREATMENT FOR MALIGNANT NEOPLASM OF PROSTATE: ICD-10-CM

## 2021-01-01 DIAGNOSIS — K59.03 THERAPEUTIC OPIOID INDUCED CONSTIPATION: ICD-10-CM

## 2021-01-01 DIAGNOSIS — E11.65 TYPE 2 DIABETES MELLITUS WITH HYPERGLYCEMIA, UNSPECIFIED WHETHER LONG TERM INSULIN USE (HCC): Primary | ICD-10-CM

## 2021-01-01 DIAGNOSIS — Z51.11 CHEMOTHERAPY MANAGEMENT, ENCOUNTER FOR: ICD-10-CM

## 2021-01-01 DIAGNOSIS — I10 HYPERTENSION, UNSPECIFIED TYPE: ICD-10-CM

## 2021-01-01 DIAGNOSIS — R53.83 OTHER FATIGUE: ICD-10-CM

## 2021-01-01 DIAGNOSIS — Z51.5 PALLIATIVE CARE ENCOUNTER: ICD-10-CM

## 2021-01-01 DIAGNOSIS — R63.0 DECREASED APPETITE: ICD-10-CM

## 2021-01-01 DIAGNOSIS — Z71.89 ADVANCE CARE PLANNING: ICD-10-CM

## 2021-01-01 DIAGNOSIS — Z23 NEED FOR VACCINATION: ICD-10-CM

## 2021-01-01 DIAGNOSIS — C79.51 SECONDARY MALIGNANT NEOPLASM OF BONE (HCC): ICD-10-CM

## 2021-01-01 DIAGNOSIS — D64.9 ANEMIA, UNSPECIFIED TYPE: ICD-10-CM

## 2021-01-01 DIAGNOSIS — G89.3 CANCER RELATED PAIN: ICD-10-CM

## 2021-01-01 DIAGNOSIS — G44.209 TENSION HEADACHE: Primary | ICD-10-CM

## 2021-01-01 DIAGNOSIS — D50.8 OTHER IRON DEFICIENCY ANEMIA: ICD-10-CM

## 2021-01-01 DIAGNOSIS — C61 PROSTATE CANCER (HCC): Primary | ICD-10-CM

## 2021-01-01 DIAGNOSIS — E10.9 TYPE 1 DIABETES MELLITUS WITHOUT COMPLICATION (HCC): ICD-10-CM

## 2021-01-01 DIAGNOSIS — R53.83 LETHARGY: ICD-10-CM

## 2021-01-01 DIAGNOSIS — S06.5X9A ACUTE SUBDURAL HEMATOMA (HCC): Primary | ICD-10-CM

## 2021-01-01 DIAGNOSIS — R53.1 GENERALIZED WEAKNESS: ICD-10-CM

## 2021-01-01 LAB
ALBUMIN SERPL-MCNC: 2.3 G/DL (ref 3.4–5)
ALBUMIN SERPL-MCNC: 2.5 G/DL (ref 3.4–5)
ALBUMIN SERPL-MCNC: 2.9 G/DL (ref 3.4–5)
ALBUMIN SERPL-MCNC: 3 G/DL (ref 3.4–5)
ALBUMIN SERPL-MCNC: 3.2 G/DL (ref 3.4–5)
ALBUMIN SERPL-MCNC: 3.3 G/DL (ref 3.4–5)
ALBUMIN/GLOB SERPL: 0.6 {RATIO} (ref 1–2)
ALBUMIN/GLOB SERPL: 0.6 {RATIO} (ref 1–2)
ALBUMIN/GLOB SERPL: 0.7 {RATIO} (ref 1–2)
ALBUMIN/GLOB SERPL: 0.7 {RATIO} (ref 1–2)
ALBUMIN/GLOB SERPL: 0.8 {RATIO} (ref 1–2)
ALP LIVER SERPL-CCNC: 115 U/L
ALP LIVER SERPL-CCNC: 134 U/L
ALP LIVER SERPL-CCNC: 150 U/L
ALP LIVER SERPL-CCNC: 236 U/L
ALP LIVER SERPL-CCNC: 251 U/L
ALP LIVER SERPL-CCNC: 293 U/L
ALT SERPL-CCNC: 10 U/L
ALT SERPL-CCNC: 11 U/L
ALT SERPL-CCNC: 14 U/L
ALT SERPL-CCNC: 17 U/L
ALT SERPL-CCNC: 17 U/L
ALT SERPL-CCNC: 19 U/L
ANION GAP SERPL CALC-SCNC: 6 MMOL/L (ref 0–18)
ANION GAP SERPL CALC-SCNC: 6 MMOL/L (ref 0–18)
ANION GAP SERPL CALC-SCNC: 7 MMOL/L (ref 0–18)
ANION GAP SERPL CALC-SCNC: 8 MMOL/L (ref 0–18)
ANION GAP SERPL CALC-SCNC: 9 MMOL/L (ref 0–18)
ANTIBODY SCREEN: NEGATIVE
ANTIBODY SCREEN: NEGATIVE
APTT PPP: 45.7 SECONDS (ref 23.2–35.3)
AST SERPL-CCNC: 11 U/L (ref 15–37)
AST SERPL-CCNC: 11 U/L (ref 15–37)
AST SERPL-CCNC: 26 U/L (ref 15–37)
AST SERPL-CCNC: 35 U/L (ref 15–37)
AST SERPL-CCNC: 63 U/L (ref 15–37)
AST SERPL-CCNC: 8 U/L (ref 15–37)
BACTERIA UR QL AUTO: NEGATIVE /HPF
BASE EXCESS BLD CALC-SCNC: 1.5 MMOL/L (ref ?–2)
BASOPHILS # BLD AUTO: 0 X10(3) UL (ref 0–0.2)
BASOPHILS # BLD AUTO: 0.01 X10(3) UL (ref 0–0.2)
BASOPHILS # BLD AUTO: 0.02 X10(3) UL (ref 0–0.2)
BASOPHILS NFR BLD AUTO: 0 %
BASOPHILS NFR BLD AUTO: 0.1 %
BASOPHILS NFR BLD AUTO: 0.2 %
BASOPHILS NFR BLD AUTO: 0.3 %
BASOPHILS NFR BLD AUTO: 0.4 %
BILIRUB DIRECT SERPL-MCNC: 0.4 MG/DL (ref 0–0.2)
BILIRUB SERPL-MCNC: 0.3 MG/DL (ref 0.1–2)
BILIRUB SERPL-MCNC: 0.4 MG/DL (ref 0.1–2)
BILIRUB SERPL-MCNC: 0.5 MG/DL (ref 0.1–2)
BILIRUB SERPL-MCNC: 1 MG/DL (ref 0.1–2)
BILIRUB UR QL: NEGATIVE
BUN BLD-MCNC: 10 MG/DL (ref 7–18)
BUN BLD-MCNC: 12 MG/DL (ref 7–18)
BUN BLD-MCNC: 13 MG/DL (ref 7–18)
BUN BLD-MCNC: 15 MG/DL (ref 7–18)
BUN BLD-MCNC: 17 MG/DL (ref 7–18)
BUN BLD-MCNC: 8 MG/DL (ref 7–18)
BUN/CREAT SERPL: 11 (ref 10–20)
BUN/CREAT SERPL: 12.9 (ref 10–20)
BUN/CREAT SERPL: 13.5 (ref 10–20)
BUN/CREAT SERPL: 13.5 (ref 10–20)
BUN/CREAT SERPL: 14.2 (ref 10–20)
BUN/CREAT SERPL: 14.7 (ref 10–20)
BUN/CREAT SERPL: 14.8 (ref 10–20)
BUN/CREAT SERPL: 18.1 (ref 10–20)
CA-I BLD-SCNC: 1 MMOL/L (ref 0.95–1.32)
CALCIUM BLD-MCNC: 7.4 MG/DL (ref 8.5–10.1)
CALCIUM BLD-MCNC: 7.8 MG/DL (ref 8.5–10.1)
CALCIUM BLD-MCNC: 8.5 MG/DL (ref 8.5–10.1)
CALCIUM BLD-MCNC: 8.7 MG/DL (ref 8.5–10.1)
CALCIUM BLD-MCNC: 9.1 MG/DL (ref 8.5–10.1)
CALCIUM BLD-MCNC: 9.3 MG/DL (ref 8.5–10.1)
CALCIUM BLD-MCNC: 9.4 MG/DL (ref 8.5–10.1)
CALCIUM BLD-MCNC: 9.5 MG/DL (ref 8.5–10.1)
CHLORIDE SERPL-SCNC: 100 MMOL/L (ref 98–112)
CHLORIDE SERPL-SCNC: 101 MMOL/L (ref 98–112)
CHLORIDE SERPL-SCNC: 102 MMOL/L (ref 98–112)
CHLORIDE SERPL-SCNC: 104 MMOL/L (ref 98–112)
CHLORIDE SERPL-SCNC: 106 MMOL/L (ref 98–112)
CHLORIDE SERPL-SCNC: 107 MMOL/L (ref 98–112)
CHLORIDE SERPL-SCNC: 97 MMOL/L (ref 98–112)
CHLORIDE SERPL-SCNC: 97 MMOL/L (ref 98–112)
CLARITY UR: CLEAR
CO2 SERPL-SCNC: 24 MMOL/L (ref 21–32)
CO2 SERPL-SCNC: 25 MMOL/L (ref 21–32)
CO2 SERPL-SCNC: 26 MMOL/L (ref 21–32)
COHGB MFR BLD: 2.6 % (ref 0–1.5)
COLOR UR: YELLOW
CREAT BLD-MCNC: 0.68 MG/DL
CREAT BLD-MCNC: 0.73 MG/DL
CREAT BLD-MCNC: 0.74 MG/DL
CREAT BLD-MCNC: 0.74 MG/DL
CREAT BLD-MCNC: 0.81 MG/DL
CREAT BLD-MCNC: 0.94 MG/DL
CREAT BLD-MCNC: 1.01 MG/DL
CREAT BLD-MCNC: 1.06 MG/DL
DEPRECATED RDW RBC AUTO: 40.2 FL (ref 35.1–46.3)
DEPRECATED RDW RBC AUTO: 40.5 FL (ref 35.1–46.3)
DEPRECATED RDW RBC AUTO: 42.6 FL (ref 35.1–46.3)
DEPRECATED RDW RBC AUTO: 42.8 FL (ref 35.1–46.3)
DEPRECATED RDW RBC AUTO: 43.7 FL (ref 35.1–46.3)
DEPRECATED RDW RBC AUTO: 73.2 FL (ref 35.1–46.3)
DEPRECATED RDW RBC AUTO: 81.6 FL (ref 35.1–46.3)
DEPRECATED RDW RBC AUTO: 85.3 FL (ref 35.1–46.3)
DEPRECATED RDW RBC AUTO: 87.3 FL (ref 35.1–46.3)
EOSINOPHIL # BLD AUTO: 0 X10(3) UL (ref 0–0.7)
EOSINOPHIL # BLD AUTO: 0 X10(3) UL (ref 0–0.7)
EOSINOPHIL # BLD AUTO: 0.02 X10(3) UL (ref 0–0.7)
EOSINOPHIL # BLD AUTO: 0.03 X10(3) UL (ref 0–0.7)
EOSINOPHIL # BLD AUTO: 0.04 X10(3) UL (ref 0–0.7)
EOSINOPHIL # BLD AUTO: 0.06 X10(3) UL (ref 0–0.7)
EOSINOPHIL # BLD AUTO: 0.09 X10(3) UL (ref 0–0.7)
EOSINOPHIL # BLD AUTO: 0.09 X10(3) UL (ref 0–0.7)
EOSINOPHIL # BLD AUTO: 0.19 X10(3) UL (ref 0–0.7)
EOSINOPHIL NFR BLD AUTO: 0 %
EOSINOPHIL NFR BLD AUTO: 0 %
EOSINOPHIL NFR BLD AUTO: 0.6 %
EOSINOPHIL NFR BLD AUTO: 0.8 %
EOSINOPHIL NFR BLD AUTO: 1.1 %
EOSINOPHIL NFR BLD AUTO: 1.2 %
EOSINOPHIL NFR BLD AUTO: 1.5 %
EOSINOPHIL NFR BLD AUTO: 1.6 %
EOSINOPHIL NFR BLD AUTO: 3.9 %
ERYTHROCYTE [DISTWIDTH] IN BLOOD BY AUTOMATED COUNT: 13.7 % (ref 11–15)
ERYTHROCYTE [DISTWIDTH] IN BLOOD BY AUTOMATED COUNT: 13.8 % (ref 11–15)
ERYTHROCYTE [DISTWIDTH] IN BLOOD BY AUTOMATED COUNT: 14.1 % (ref 11–15)
ERYTHROCYTE [DISTWIDTH] IN BLOOD BY AUTOMATED COUNT: 14.3 % (ref 11–15)
ERYTHROCYTE [DISTWIDTH] IN BLOOD BY AUTOMATED COUNT: 19.3 % (ref 11–15)
ERYTHROCYTE [DISTWIDTH] IN BLOOD BY AUTOMATED COUNT: 26.2 % (ref 11–15)
ERYTHROCYTE [DISTWIDTH] IN BLOOD BY AUTOMATED COUNT: 26.9 % (ref 11–15)
ERYTHROCYTE [DISTWIDTH] IN BLOOD BY AUTOMATED COUNT: 27.5 % (ref 11–15)
ERYTHROCYTE [DISTWIDTH] IN BLOOD BY AUTOMATED COUNT: 27.9 % (ref 11–15)
EST. AVERAGE GLUCOSE BLD GHB EST-MCNC: 174 MG/DL (ref 68–126)
GLOBULIN PLAS-MCNC: 4 G/DL (ref 2.8–4.4)
GLOBULIN PLAS-MCNC: 4.5 G/DL (ref 2.8–4.4)
GLOBULIN PLAS-MCNC: 4.5 G/DL (ref 2.8–4.4)
GLOBULIN PLAS-MCNC: 4.6 G/DL (ref 2.8–4.4)
GLOBULIN PLAS-MCNC: 4.6 G/DL (ref 2.8–4.4)
GLUCOSE BLD-MCNC: 105 MG/DL (ref 70–99)
GLUCOSE BLD-MCNC: 144 MG/DL (ref 70–99)
GLUCOSE BLD-MCNC: 153 MG/DL (ref 70–99)
GLUCOSE BLD-MCNC: 168 MG/DL (ref 70–99)
GLUCOSE BLD-MCNC: 217 MG/DL (ref 70–99)
GLUCOSE BLD-MCNC: 218 MG/DL (ref 70–99)
GLUCOSE BLD-MCNC: 240 MG/DL (ref 70–99)
GLUCOSE BLD-MCNC: 310 MG/DL (ref 70–99)
GLUCOSE BLDC GLUCOMTR-MCNC: 119 MG/DL (ref 70–99)
GLUCOSE BLDC GLUCOMTR-MCNC: 244 MG/DL (ref 70–99)
GLUCOSE BLDC GLUCOMTR-MCNC: 298 MG/DL (ref 70–99)
GLUCOSE BLDC GLUCOMTR-MCNC: 384 MG/DL (ref 70–99)
GLUCOSE BLDC GLUCOMTR-MCNC: 412 MG/DL (ref 70–99)
GLUCOSE BLDC GLUCOMTR-MCNC: 433 MG/DL (ref 70–99)
GLUCOSE BLDC GLUCOMTR-MCNC: 73 MG/DL (ref 70–99)
GLUCOSE BLDC GLUCOMTR-MCNC: 73 MG/DL (ref 70–99)
GLUCOSE UR-MCNC: 150 MG/DL
GLUCOSE UR-MCNC: 50 MG/DL
GLUCOSE UR-MCNC: >=500 MG/DL
HAV IGM SER QL: 1.4 MG/DL (ref 1.6–2.6)
HAV IGM SER QL: 1.8 MG/DL (ref 1.6–2.6)
HAV IGM SER QL: 1.8 MG/DL (ref 1.6–2.6)
HAV IGM SER QL: 2.2 MG/DL (ref 1.6–2.6)
HBA1C MFR BLD HPLC: 7.7 % (ref ?–5.7)
HCO3 BLDA-SCNC: 26.1 MEQ/L (ref 21–27)
HCT VFR BLD AUTO: 24.4 %
HCT VFR BLD AUTO: 25.8 %
HCT VFR BLD AUTO: 26.8 %
HCT VFR BLD AUTO: 27.1 %
HCT VFR BLD AUTO: 27.1 %
HCT VFR BLD AUTO: 30.8 %
HCT VFR BLD AUTO: 34.7 %
HCT VFR BLD AUTO: 35.6 %
HCT VFR BLD AUTO: 36.5 %
HCT VFR BLD AUTO: 36.6 %
HGB BLD-MCNC: 11.4 G/DL
HGB BLD-MCNC: 11.5 G/DL
HGB BLD-MCNC: 11.8 G/DL
HGB BLD-MCNC: 12 G/DL
HGB BLD-MCNC: 7 G/DL (ref 13.5–17.5)
HGB BLD-MCNC: 8.5 G/DL
HGB BLD-MCNC: 8.5 G/DL
HGB BLD-MCNC: 9.2 G/DL
HGB BLD-MCNC: 9.3 G/DL
HGB BLD-MCNC: 9.3 G/DL
HGB BLD-MCNC: 9.9 G/DL
IMM GRANULOCYTES # BLD AUTO: 0.02 X10(3) UL (ref 0–1)
IMM GRANULOCYTES # BLD AUTO: 0.02 X10(3) UL (ref 0–1)
IMM GRANULOCYTES # BLD AUTO: 0.03 X10(3) UL (ref 0–1)
IMM GRANULOCYTES # BLD AUTO: 0.04 X10(3) UL (ref 0–1)
IMM GRANULOCYTES # BLD AUTO: 0.05 X10(3) UL (ref 0–1)
IMM GRANULOCYTES NFR BLD: 0.4 %
IMM GRANULOCYTES NFR BLD: 0.4 %
IMM GRANULOCYTES NFR BLD: 0.6 %
IMM GRANULOCYTES NFR BLD: 0.6 %
IMM GRANULOCYTES NFR BLD: 0.7 %
IMM GRANULOCYTES NFR BLD: 0.8 %
IMM GRANULOCYTES NFR BLD: 0.8 %
IMM GRANULOCYTES NFR BLD: 1 %
IMM GRANULOCYTES NFR BLD: 1.4 %
INR BLD: 4.39 (ref 0.9–1.2)
IRON SATURATION: 21 %
IRON SERPL-MCNC: 56 UG/DL
KETONES UR-MCNC: NEGATIVE MG/DL
KETONES UR-MCNC: NEGATIVE MG/DL
LACTATE BLD-SCNC: 1.3 MMOL/L (ref 0.5–2.2)
LEUKOCYTE ESTERASE UR QL STRIP.AUTO: NEGATIVE
LYMPHOCYTES # BLD AUTO: 0.35 X10(3) UL (ref 1–4)
LYMPHOCYTES # BLD AUTO: 0.35 X10(3) UL (ref 1–4)
LYMPHOCYTES # BLD AUTO: 0.36 X10(3) UL (ref 1–4)
LYMPHOCYTES # BLD AUTO: 0.38 X10(3) UL (ref 1–4)
LYMPHOCYTES # BLD AUTO: 0.67 X10(3) UL (ref 1–4)
LYMPHOCYTES # BLD AUTO: 0.96 X10(3) UL (ref 1–4)
LYMPHOCYTES # BLD AUTO: 0.98 X10(3) UL (ref 1–4)
LYMPHOCYTES # BLD AUTO: 1.35 X10(3) UL (ref 1–4)
LYMPHOCYTES # BLD AUTO: 1.82 X10(3) UL (ref 1–4)
LYMPHOCYTES NFR BLD AUTO: 10 %
LYMPHOCYTES NFR BLD AUTO: 12.5 %
LYMPHOCYTES NFR BLD AUTO: 12.8 %
LYMPHOCYTES NFR BLD AUTO: 14.3 %
LYMPHOCYTES NFR BLD AUTO: 18.9 %
LYMPHOCYTES NFR BLD AUTO: 19.9 %
LYMPHOCYTES NFR BLD AUTO: 24 %
LYMPHOCYTES NFR BLD AUTO: 30.2 %
LYMPHOCYTES NFR BLD AUTO: 7.2 %
M PROTEIN MFR SERPL ELPH: 6.6 G/DL (ref 6.4–8.2)
M PROTEIN MFR SERPL ELPH: 7 G/DL (ref 6.4–8.2)
M PROTEIN MFR SERPL ELPH: 7 G/DL (ref 6.4–8.2)
M PROTEIN MFR SERPL ELPH: 7.5 G/DL (ref 6.4–8.2)
M PROTEIN MFR SERPL ELPH: 7.8 G/DL (ref 6.4–8.2)
M PROTEIN MFR SERPL ELPH: 7.8 G/DL (ref 6.4–8.2)
MCH RBC QN AUTO: 25.5 PG (ref 26–34)
MCH RBC QN AUTO: 26.7 PG (ref 26–34)
MCH RBC QN AUTO: 26.9 PG (ref 26–34)
MCH RBC QN AUTO: 26.9 PG (ref 26–34)
MCH RBC QN AUTO: 27.1 PG (ref 26–34)
MCH RBC QN AUTO: 27.4 PG (ref 26–34)
MCH RBC QN AUTO: 29.7 PG (ref 26–34)
MCH RBC QN AUTO: 30.1 PG (ref 26–34)
MCH RBC QN AUTO: 30.8 PG (ref 26–34)
MCHC RBC AUTO-ENTMCNC: 32.1 G/DL (ref 31–37)
MCHC RBC AUTO-ENTMCNC: 32.3 G/DL (ref 31–37)
MCHC RBC AUTO-ENTMCNC: 32.3 G/DL (ref 31–37)
MCHC RBC AUTO-ENTMCNC: 32.8 G/DL (ref 31–37)
MCHC RBC AUTO-ENTMCNC: 32.9 G/DL (ref 31–37)
MCHC RBC AUTO-ENTMCNC: 32.9 G/DL (ref 31–37)
MCHC RBC AUTO-ENTMCNC: 34.3 G/DL (ref 31–37)
MCHC RBC AUTO-ENTMCNC: 34.3 G/DL (ref 31–37)
MCHC RBC AUTO-ENTMCNC: 34.8 G/DL (ref 31–37)
MCV RBC AUTO: 79.4 FL
MCV RBC AUTO: 81.3 FL
MCV RBC AUTO: 82.1 FL
MCV RBC AUTO: 83.2 FL
MCV RBC AUTO: 83.3 FL
MCV RBC AUTO: 83.8 FL
MCV RBC AUTO: 86.5 FL
MCV RBC AUTO: 87.7 FL
MCV RBC AUTO: 88.4 FL
METHGB MFR BLD: 2 % SAT (ref 0.4–1.5)
MODIFIED ALLEN TEST: POSITIVE
MONOCYTES # BLD AUTO: 0.27 X10(3) UL (ref 0.1–1)
MONOCYTES # BLD AUTO: 0.43 X10(3) UL (ref 0.1–1)
MONOCYTES # BLD AUTO: 0.43 X10(3) UL (ref 0.1–1)
MONOCYTES # BLD AUTO: 0.44 X10(3) UL (ref 0.1–1)
MONOCYTES # BLD AUTO: 0.45 X10(3) UL (ref 0.1–1)
MONOCYTES # BLD AUTO: 0.48 X10(3) UL (ref 0.1–1)
MONOCYTES # BLD AUTO: 0.49 X10(3) UL (ref 0.1–1)
MONOCYTES # BLD AUTO: 0.54 X10(3) UL (ref 0.1–1)
MONOCYTES # BLD AUTO: 0.56 X10(3) UL (ref 0.1–1)
MONOCYTES NFR BLD AUTO: 10.1 %
MONOCYTES NFR BLD AUTO: 11 %
MONOCYTES NFR BLD AUTO: 11.4 %
MONOCYTES NFR BLD AUTO: 12.1 %
MONOCYTES NFR BLD AUTO: 12.5 %
MONOCYTES NFR BLD AUTO: 15.7 %
MONOCYTES NFR BLD AUTO: 5.7 %
MONOCYTES NFR BLD AUTO: 7.8 %
MONOCYTES NFR BLD AUTO: 9 %
NEUTROPHILS # BLD AUTO: 1.77 X10 (3) UL (ref 1.5–7.7)
NEUTROPHILS # BLD AUTO: 1.77 X10(3) UL (ref 1.5–7.7)
NEUTROPHILS # BLD AUTO: 2.15 X10 (3) UL (ref 1.5–7.7)
NEUTROPHILS # BLD AUTO: 2.15 X10(3) UL (ref 1.5–7.7)
NEUTROPHILS # BLD AUTO: 2.36 X10 (3) UL (ref 1.5–7.7)
NEUTROPHILS # BLD AUTO: 2.36 X10(3) UL (ref 1.5–7.7)
NEUTROPHILS # BLD AUTO: 2.71 X10 (3) UL (ref 1.5–7.7)
NEUTROPHILS # BLD AUTO: 2.71 X10(3) UL (ref 1.5–7.7)
NEUTROPHILS # BLD AUTO: 3.14 X10 (3) UL (ref 1.5–7.7)
NEUTROPHILS # BLD AUTO: 3.14 X10(3) UL (ref 1.5–7.7)
NEUTROPHILS # BLD AUTO: 3.51 X10 (3) UL (ref 1.5–7.7)
NEUTROPHILS # BLD AUTO: 3.51 X10(3) UL (ref 1.5–7.7)
NEUTROPHILS # BLD AUTO: 3.71 X10 (3) UL (ref 1.5–7.7)
NEUTROPHILS # BLD AUTO: 3.71 X10(3) UL (ref 1.5–7.7)
NEUTROPHILS # BLD AUTO: 3.96 X10 (3) UL (ref 1.5–7.7)
NEUTROPHILS # BLD AUTO: 3.96 X10(3) UL (ref 1.5–7.7)
NEUTROPHILS # BLD AUTO: 5.99 X10 (3) UL (ref 1.5–7.7)
NEUTROPHILS # BLD AUTO: 5.99 X10(3) UL (ref 1.5–7.7)
NEUTROPHILS NFR BLD AUTO: 58.3 %
NEUTROPHILS NFR BLD AUTO: 63.8 %
NEUTROPHILS NFR BLD AUTO: 65.8 %
NEUTROPHILS NFR BLD AUTO: 66.8 %
NEUTROPHILS NFR BLD AUTO: 70.5 %
NEUTROPHILS NFR BLD AUTO: 72.3 %
NEUTROPHILS NFR BLD AUTO: 75.5 %
NEUTROPHILS NFR BLD AUTO: 80.2 %
NEUTROPHILS NFR BLD AUTO: 81.9 %
NITRITE UR QL STRIP.AUTO: NEGATIVE
O2 CT BLD-SCNC: 10.8 VOL% (ref 15–23)
O2/TOTAL GAS SETTING VFR VENT: 100 %
OSMOLALITY SERPL CALC.SUM OF ELEC: 274 MOSM/KG (ref 275–295)
OSMOLALITY SERPL CALC.SUM OF ELEC: 277 MOSM/KG (ref 275–295)
OSMOLALITY SERPL CALC.SUM OF ELEC: 277 MOSM/KG (ref 275–295)
OSMOLALITY SERPL CALC.SUM OF ELEC: 282 MOSM/KG (ref 275–295)
OSMOLALITY SERPL CALC.SUM OF ELEC: 286 MOSM/KG (ref 275–295)
OSMOLALITY SERPL CALC.SUM OF ELEC: 291 MOSM/KG (ref 275–295)
OSMOLALITY SERPL CALC.SUM OF ELEC: 293 MOSM/KG (ref 275–295)
OSMOLALITY SERPL CALC.SUM OF ELEC: 295 MOSM/KG (ref 275–295)
PCO2 BLDA: 29 MM HG (ref 35–45)
PEEP SETTING VENT: 5 CM H2O
PH BLDA: 7.53 [PH] (ref 7.35–7.45)
PH UR: 6 [PH] (ref 5–8)
PH UR: 7 [PH] (ref 5–8)
PH UR: 8 [PH] (ref 5–8)
PHOSPHATE SERPL-MCNC: 1.5 MG/DL (ref 2.5–4.9)
PHOSPHATE SERPL-MCNC: 1.9 MG/DL (ref 2.5–4.9)
PHOSPHATE SERPL-MCNC: 3.3 MG/DL (ref 2.5–4.9)
PHOSPHATE SERPL-MCNC: 4.2 MG/DL (ref 2.5–4.9)
PLATELET # BLD AUTO: 102 10(3)UL (ref 150–450)
PLATELET # BLD AUTO: 138 10(3)UL (ref 150–450)
PLATELET # BLD AUTO: 156 10(3)UL (ref 150–450)
PLATELET # BLD AUTO: 161 10(3)UL (ref 150–450)
PLATELET # BLD AUTO: 213 10(3)UL (ref 150–450)
PLATELET # BLD AUTO: 216 10(3)UL (ref 150–450)
PLATELET # BLD AUTO: 235 10(3)UL (ref 150–450)
PLATELET # BLD AUTO: 76 10(3)UL (ref 150–450)
PLATELET # BLD AUTO: 94 10(3)UL (ref 150–450)
PLATELET MORPHOLOGY: NORMAL
PLATELET MORPHOLOGY: NORMAL
PO2 BLDA: 461 MM HG (ref 80–100)
POTASSIUM BLD-SCNC: 3.5 MMOL/L (ref 3.3–5.1)
POTASSIUM SERPL-SCNC: 3.7 MMOL/L (ref 3.5–5.1)
POTASSIUM SERPL-SCNC: 3.7 MMOL/L (ref 3.5–5.1)
POTASSIUM SERPL-SCNC: 3.9 MMOL/L (ref 3.5–5.1)
POTASSIUM SERPL-SCNC: 3.9 MMOL/L (ref 3.5–5.1)
POTASSIUM SERPL-SCNC: 4 MMOL/L (ref 3.5–5.1)
POTASSIUM SERPL-SCNC: 4.1 MMOL/L (ref 3.5–5.1)
POTASSIUM SERPL-SCNC: 4.1 MMOL/L (ref 3.5–5.1)
POTASSIUM SERPL-SCNC: 4.4 MMOL/L (ref 3.5–5.1)
PROT UR-MCNC: 100 MG/DL
PROT UR-MCNC: 100 MG/DL
PROT UR-MCNC: 30 MG/DL
PROTHROMBIN TIME: 41.7 SECONDS (ref 11.8–14.5)
PSA SERPL-MCNC: 0.15 NG/ML (ref ?–4)
PSA SERPL-MCNC: 2.68 NG/ML (ref ?–4)
PSA SERPL-MCNC: 203 NG/ML (ref ?–4)
PSA SERPL-MCNC: 234 NG/ML (ref ?–4)
PSA SERPL-MCNC: 3.77 NG/ML (ref ?–4)
PUNCTURE CHARGE: YES
RBC # BLD AUTO: 2.76 X10(6)UL
RBC # BLD AUTO: 3.09 X10(6)UL
RBC # BLD AUTO: 3.1 X10(6)UL
RBC # BLD AUTO: 3.1 X10(6)UL
RBC # BLD AUTO: 3.88 X10(6)UL
RBC # BLD AUTO: 4.25 X10(6)UL
RBC # BLD AUTO: 4.27 X10(6)UL
RBC # BLD AUTO: 4.38 X10(6)UL
RBC # BLD AUTO: 4.46 X10(6)UL
RBC #/AREA URNS AUTO: 15 /HPF
RESP RATE: 18 BPM
RH BLOOD TYPE: POSITIVE
RH BLOOD TYPE: POSITIVE
SAO2 % BLDA: >99 % (ref 94–100)
SARS-COV-2 RNA RESP QL NAA+PROBE: NOT DETECTED
SODIUM BLD-SCNC: 129 MMOL/L (ref 135–145)
SODIUM SERPL-SCNC: 128 MMOL/L (ref 136–145)
SODIUM SERPL-SCNC: 128 MMOL/L (ref 136–145)
SODIUM SERPL-SCNC: 133 MMOL/L (ref 136–145)
SODIUM SERPL-SCNC: 134 MMOL/L (ref 136–145)
SODIUM SERPL-SCNC: 135 MMOL/L (ref 136–145)
SODIUM SERPL-SCNC: 139 MMOL/L (ref 136–145)
SODIUM SERPL-SCNC: 139 MMOL/L (ref 136–145)
SODIUM SERPL-SCNC: 140 MMOL/L (ref 136–145)
SP GR UR STRIP: 1.01 (ref 1–1.03)
SP GR UR STRIP: 1.01 (ref 1–1.03)
SP GR UR STRIP: 1.02 (ref 1–1.03)
SPECIMEN VOL 24H UR: 500 ML
TESTOST SERPL-MCNC: 7.68 NG/DL
TESTOST SERPL-MCNC: <7 NG/DL
TOTAL IRON BINDING CAPACITY: 264 UG/DL (ref 240–450)
TRANSFERRIN SERPL-MCNC: 177 MG/DL (ref 200–360)
TROPONIN I SERPL-MCNC: <0.045 NG/ML (ref ?–0.04)
TSI SER-ACNC: 2.48 MIU/ML (ref 0.36–3.74)
UROBILINOGEN UR STRIP-ACNC: 4
UROBILINOGEN UR STRIP-ACNC: 4
UROBILINOGEN UR STRIP-ACNC: <2
WBC # BLD AUTO: 2.5 X10(3) UL (ref 4–11)
WBC # BLD AUTO: 3.1 X10(3) UL (ref 4–11)
WBC # BLD AUTO: 3.5 X10(3) UL (ref 4–11)
WBC # BLD AUTO: 3.6 X10(3) UL (ref 4–11)
WBC # BLD AUTO: 4.8 X10(3) UL (ref 4–11)
WBC # BLD AUTO: 4.9 X10(3) UL (ref 4–11)
WBC # BLD AUTO: 5.6 X10(3) UL (ref 4–11)
WBC # BLD AUTO: 6 X10(3) UL (ref 4–11)
WBC # BLD AUTO: 7.5 X10(3) UL (ref 4–11)
WBC #/AREA URNS AUTO: 1 /HPF

## 2021-01-01 PROCEDURE — 84153 ASSAY OF PSA TOTAL: CPT

## 2021-01-01 PROCEDURE — 85025 COMPLETE CBC W/AUTO DIFF WBC: CPT

## 2021-01-01 PROCEDURE — 80053 COMPREHEN METABOLIC PANEL: CPT

## 2021-01-01 PROCEDURE — 84100 ASSAY OF PHOSPHORUS: CPT

## 2021-01-01 PROCEDURE — 96374 THER/PROPH/DIAG INJ IV PUSH: CPT

## 2021-01-01 PROCEDURE — 84484 ASSAY OF TROPONIN QUANT: CPT

## 2021-01-01 PROCEDURE — 80048 BASIC METABOLIC PNL TOTAL CA: CPT | Performed by: EMERGENCY MEDICINE

## 2021-01-01 PROCEDURE — 99215 OFFICE O/P EST HI 40 MIN: CPT | Performed by: INTERNAL MEDICINE

## 2021-01-01 PROCEDURE — 77307 TELETHX ISODOSE PLAN CPLX: CPT | Performed by: RADIOLOGY

## 2021-01-01 PROCEDURE — 71045 X-RAY EXAM CHEST 1 VIEW: CPT | Performed by: EMERGENCY MEDICINE

## 2021-01-01 PROCEDURE — 77290 THER RAD SIMULAJ FIELD CPLX: CPT | Performed by: RADIOLOGY

## 2021-01-01 PROCEDURE — 96361 HYDRATE IV INFUSION ADD-ON: CPT

## 2021-01-01 PROCEDURE — 99212 OFFICE O/P EST SF 10 MIN: CPT

## 2021-01-01 PROCEDURE — 99291 CRITICAL CARE FIRST HOUR: CPT | Performed by: OTHER

## 2021-01-01 PROCEDURE — 96413 CHEMO IV INFUSION 1 HR: CPT

## 2021-01-01 PROCEDURE — 84466 ASSAY OF TRANSFERRIN: CPT

## 2021-01-01 PROCEDURE — 99214 OFFICE O/P EST MOD 30 MIN: CPT | Performed by: NURSE PRACTITIONER

## 2021-01-01 PROCEDURE — 84484 ASSAY OF TROPONIN QUANT: CPT | Performed by: EMERGENCY MEDICINE

## 2021-01-01 PROCEDURE — 96372 THER/PROPH/DIAG INJ SC/IM: CPT

## 2021-01-01 PROCEDURE — 83735 ASSAY OF MAGNESIUM: CPT

## 2021-01-01 PROCEDURE — 78306 BONE IMAGING WHOLE BODY: CPT | Performed by: RADIOLOGY

## 2021-01-01 PROCEDURE — 93005 ELECTROCARDIOGRAM TRACING: CPT

## 2021-01-01 PROCEDURE — 86850 RBC ANTIBODY SCREEN: CPT

## 2021-01-01 PROCEDURE — 96375 TX/PRO/DX INJ NEW DRUG ADDON: CPT

## 2021-01-01 PROCEDURE — 82962 GLUCOSE BLOOD TEST: CPT

## 2021-01-01 PROCEDURE — 81001 URINALYSIS AUTO W/SCOPE: CPT | Performed by: EMERGENCY MEDICINE

## 2021-01-01 PROCEDURE — 74176 CT ABD & PELVIS W/O CONTRAST: CPT | Performed by: NURSE PRACTITIONER

## 2021-01-01 PROCEDURE — 0001A SARSCOV2 VAC 30MCG/0.3ML IM: CPT

## 2021-01-01 PROCEDURE — 86900 BLOOD TYPING SEROLOGIC ABO: CPT

## 2021-01-01 PROCEDURE — 78306 BONE IMAGING WHOLE BODY: CPT | Performed by: NURSE PRACTITIONER

## 2021-01-01 PROCEDURE — 84403 ASSAY OF TOTAL TESTOSTERONE: CPT

## 2021-01-01 PROCEDURE — 99284 EMERGENCY DEPT VISIT MOD MDM: CPT

## 2021-01-01 PROCEDURE — 93010 ELECTROCARDIOGRAM REPORT: CPT | Performed by: EMERGENCY MEDICINE

## 2021-01-01 PROCEDURE — 77470 SPECIAL RADIATION TREATMENT: CPT | Performed by: RADIOLOGY

## 2021-01-01 PROCEDURE — 36415 COLL VENOUS BLD VENIPUNCTURE: CPT

## 2021-01-01 PROCEDURE — 96360 HYDRATION IV INFUSION INIT: CPT

## 2021-01-01 PROCEDURE — 99211 OFF/OP EST MAY X REQ PHY/QHP: CPT

## 2021-01-01 PROCEDURE — 84443 ASSAY THYROID STIM HORMONE: CPT

## 2021-01-01 PROCEDURE — 85025 COMPLETE CBC W/AUTO DIFF WBC: CPT | Performed by: EMERGENCY MEDICINE

## 2021-01-01 PROCEDURE — 5A1935Z RESPIRATORY VENTILATION, LESS THAN 24 CONSECUTIVE HOURS: ICD-10-PCS | Performed by: EMERGENCY MEDICINE

## 2021-01-01 PROCEDURE — 77334 RADIATION TREATMENT AID(S): CPT | Performed by: RADIOLOGY

## 2021-01-01 PROCEDURE — 99285 EMERGENCY DEPT VISIT HI MDM: CPT

## 2021-01-01 PROCEDURE — 86901 BLOOD TYPING SEROLOGIC RH(D): CPT

## 2021-01-01 PROCEDURE — 99215 OFFICE O/P EST HI 40 MIN: CPT | Performed by: PHYSICIAN ASSISTANT

## 2021-01-01 PROCEDURE — 77412 RADIATION TX DELIVERY LVL 3: CPT | Performed by: RADIOLOGY

## 2021-01-01 PROCEDURE — 71250 CT THORAX DX C-: CPT | Performed by: NURSE PRACTITIONER

## 2021-01-01 PROCEDURE — 83540 ASSAY OF IRON: CPT

## 2021-01-01 PROCEDURE — 96402 CHEMO HORMON ANTINEOPL SQ/IM: CPT

## 2021-01-01 PROCEDURE — 80048 BASIC METABOLIC PNL TOTAL CA: CPT

## 2021-01-01 PROCEDURE — 99205 OFFICE O/P NEW HI 60 MIN: CPT | Performed by: NURSE PRACTITIONER

## 2021-01-01 PROCEDURE — 85610 PROTHROMBIN TIME: CPT | Performed by: EMERGENCY MEDICINE

## 2021-01-01 PROCEDURE — 85014 HEMATOCRIT: CPT

## 2021-01-01 PROCEDURE — 82533 TOTAL CORTISOL: CPT | Performed by: EMERGENCY MEDICINE

## 2021-01-01 PROCEDURE — 0002A SARSCOV2 VAC 30MCG/0.3ML IM: CPT

## 2021-01-01 PROCEDURE — 84443 ASSAY THYROID STIM HORMONE: CPT | Performed by: EMERGENCY MEDICINE

## 2021-01-01 PROCEDURE — 99291 CRITICAL CARE FIRST HOUR: CPT | Performed by: INTERNAL MEDICINE

## 2021-01-01 PROCEDURE — 77417 THER RADIOLOGY PORT IMAGE(S): CPT | Performed by: RADIOLOGY

## 2021-01-01 PROCEDURE — 93010 ELECTROCARDIOGRAM REPORT: CPT | Performed by: INTERNAL MEDICINE

## 2021-01-01 PROCEDURE — 74176 CT ABD & PELVIS W/O CONTRAST: CPT | Performed by: EMERGENCY MEDICINE

## 2021-01-01 PROCEDURE — 36430 TRANSFUSION BLD/BLD COMPNT: CPT

## 2021-01-01 PROCEDURE — 88363 XM ARCHIVE TISSUE MOLEC ANAL: CPT | Performed by: INTERNAL MEDICINE

## 2021-01-01 PROCEDURE — 70450 CT HEAD/BRAIN W/O DYE: CPT | Performed by: EMERGENCY MEDICINE

## 2021-01-01 PROCEDURE — 77331 SPECIAL RADIATION DOSIMETRY: CPT | Performed by: RADIOLOGY

## 2021-01-01 PROCEDURE — 85730 THROMBOPLASTIN TIME PARTIAL: CPT | Performed by: EMERGENCY MEDICINE

## 2021-01-01 PROCEDURE — 99213 OFFICE O/P EST LOW 20 MIN: CPT | Performed by: NURSE PRACTITIONER

## 2021-01-01 PROCEDURE — 77280 THER RAD SIMULAJ FIELD SMPL: CPT | Performed by: RADIOLOGY

## 2021-01-01 PROCEDURE — 72072 X-RAY EXAM THORAC SPINE 3VWS: CPT | Performed by: EMERGENCY MEDICINE

## 2021-01-01 PROCEDURE — 0BH17EZ INSERTION OF ENDOTRACHEAL AIRWAY INTO TRACHEA, VIA NATURAL OR ARTIFICIAL OPENING: ICD-10-PCS | Performed by: EMERGENCY MEDICINE

## 2021-01-01 PROCEDURE — 85018 HEMOGLOBIN: CPT

## 2021-01-01 RX ORDER — MANNITOL 20 G/100ML
1 INJECTION, SOLUTION INTRAVENOUS ONCE
Status: COMPLETED | OUTPATIENT
Start: 2021-01-01 | End: 2021-01-01

## 2021-01-01 RX ORDER — ETOMIDATE 2 MG/ML
20 INJECTION INTRAVENOUS ONCE
Status: COMPLETED | OUTPATIENT
Start: 2021-01-01 | End: 2021-01-01

## 2021-01-01 RX ORDER — ACETAMINOPHEN 325 MG/1
650 TABLET ORAL EVERY 6 HOURS PRN
Status: DISCONTINUED | OUTPATIENT
Start: 2021-01-01 | End: 2021-01-01

## 2021-01-01 RX ORDER — ACETAMINOPHEN 650 MG/1
650 SUPPOSITORY RECTAL EVERY 6 HOURS PRN
Status: DISCONTINUED | OUTPATIENT
Start: 2021-01-01 | End: 2021-01-01

## 2021-01-01 RX ORDER — SODIUM PHOSPHATE, DIBASIC AND SODIUM PHOSPHATE, MONOBASIC 7; 19 G/133ML; G/133ML
1 ENEMA RECTAL ONCE AS NEEDED
Status: DISCONTINUED | OUTPATIENT
Start: 2021-01-01 | End: 2021-01-01

## 2021-01-01 RX ORDER — POLYETHYLENE GLYCOL 3350 17 G/17G
17 POWDER, FOR SOLUTION ORAL DAILY PRN
Status: DISCONTINUED | OUTPATIENT
Start: 2021-01-01 | End: 2021-01-01

## 2021-01-01 RX ORDER — ACETAMINOPHEN 160 MG/5ML
650 SOLUTION ORAL EVERY 6 HOURS PRN
Status: DISCONTINUED | OUTPATIENT
Start: 2021-01-01 | End: 2021-01-01

## 2021-01-01 RX ORDER — SODIUM CHLORIDE 9 MG/ML
INJECTION, SOLUTION INTRAVENOUS CONTINUOUS
Status: ACTIVE | OUTPATIENT
Start: 2021-01-01 | End: 2021-01-01

## 2021-01-01 RX ORDER — ONDANSETRON 2 MG/ML
4 INJECTION INTRAMUSCULAR; INTRAVENOUS ONCE
Status: COMPLETED | OUTPATIENT
Start: 2021-01-01 | End: 2021-01-01

## 2021-01-01 RX ORDER — DEXTROSE MONOHYDRATE 25 G/50ML
50 INJECTION, SOLUTION INTRAVENOUS
Status: DISCONTINUED | OUTPATIENT
Start: 2021-01-01 | End: 2021-01-01

## 2021-01-01 RX ORDER — DEXTROSE AND SODIUM CHLORIDE 5; .9 G/100ML; G/100ML
INJECTION, SOLUTION INTRAVENOUS CONTINUOUS
Status: DISCONTINUED | OUTPATIENT
Start: 2021-01-01 | End: 2021-01-01

## 2021-01-01 RX ORDER — ETOMIDATE 2 MG/ML
INJECTION INTRAVENOUS
Status: COMPLETED
Start: 2021-01-01 | End: 2021-01-01

## 2021-01-01 RX ORDER — SODIUM CHLORIDE, SODIUM LACTATE, POTASSIUM CHLORIDE, CALCIUM CHLORIDE 600; 310; 30; 20 MG/100ML; MG/100ML; MG/100ML; MG/100ML
INJECTION, SOLUTION INTRAVENOUS CONTINUOUS
Status: DISCONTINUED | OUTPATIENT
Start: 2021-01-01 | End: 2021-01-01

## 2021-01-01 RX ORDER — MORPHINE SULFATE 15 MG/1
15 TABLET, FILM COATED, EXTENDED RELEASE ORAL EVERY 12 HOURS SCHEDULED
Qty: 60 TABLET | Refills: 0 | Status: SHIPPED | OUTPATIENT
Start: 2021-01-01 | End: 2021-09-02

## 2021-01-01 RX ORDER — POLYETHYLENE GLYCOL 3350 17 G/17G
17 POWDER, FOR SOLUTION ORAL 2 TIMES DAILY PRN
Qty: 30 EACH | Refills: 0 | Status: SHIPPED | OUTPATIENT
Start: 2021-01-01 | End: 2021-09-16

## 2021-01-01 RX ORDER — HYDROCODONE BITARTRATE AND ACETAMINOPHEN 5; 325 MG/1; MG/1
1 TABLET ORAL EVERY 6 HOURS PRN
COMMUNITY

## 2021-01-01 RX ORDER — ACETAMINOPHEN 500 MG
1000 TABLET ORAL EVERY 6 HOURS PRN
COMMUNITY

## 2021-01-01 RX ORDER — PROCHLORPERAZINE MALEATE 10 MG
10 TABLET ORAL EVERY 6 HOURS PRN
Qty: 30 TABLET | Refills: 3 | Status: SHIPPED | OUTPATIENT
Start: 2021-01-01

## 2021-01-01 RX ORDER — PROCHLORPERAZINE MALEATE 10 MG
10 TABLET ORAL EVERY 8 HOURS PRN
Qty: 60 TABLET | Refills: 1 | Status: SHIPPED | OUTPATIENT
Start: 2021-01-01 | End: 2021-01-01

## 2021-01-01 RX ORDER — BISACODYL 10 MG
10 SUPPOSITORY, RECTAL RECTAL
Status: DISCONTINUED | OUTPATIENT
Start: 2021-01-01 | End: 2021-01-01

## 2021-01-01 RX ORDER — MORPHINE SULFATE 4 MG/ML
4 INJECTION, SOLUTION INTRAMUSCULAR; INTRAVENOUS ONCE
Status: COMPLETED | OUTPATIENT
Start: 2021-01-01 | End: 2021-01-01

## 2021-01-01 RX ORDER — TRAMADOL HYDROCHLORIDE 50 MG/1
50 TABLET ORAL EVERY 6 HOURS PRN
COMMUNITY
End: 2021-01-01

## 2021-01-01 RX ORDER — CHLORHEXIDINE GLUCONATE 0.12 MG/ML
15 RINSE ORAL
Status: DISCONTINUED | OUTPATIENT
Start: 2021-01-01 | End: 2021-01-01

## 2021-01-01 RX ORDER — ONDANSETRON 4 MG/1
4 TABLET, ORALLY DISINTEGRATING ORAL EVERY 4 HOURS PRN
Qty: 30 TABLET | Refills: 0 | Status: SHIPPED | OUTPATIENT
Start: 2021-01-01 | End: 2021-08-24

## 2021-01-01 RX ORDER — ROCURONIUM BROMIDE 10 MG/ML
40 INJECTION, SOLUTION INTRAVENOUS ONCE
Status: COMPLETED | OUTPATIENT
Start: 2021-01-01 | End: 2021-01-01

## 2021-01-01 RX ORDER — MORPHINE SULFATE 4 MG/ML
6 INJECTION, SOLUTION INTRAMUSCULAR; INTRAVENOUS ONCE
Status: COMPLETED | OUTPATIENT
Start: 2021-01-01 | End: 2021-01-01

## 2021-01-01 RX ORDER — OMEPRAZOLE 40 MG/1
40 CAPSULE, DELAYED RELEASE ORAL DAILY
Qty: 30 CAPSULE | Refills: 0 | Status: SHIPPED | OUTPATIENT
Start: 2021-01-01

## 2021-01-01 RX ORDER — MORPHINE SULFATE 2 MG/ML
2 INJECTION, SOLUTION INTRAMUSCULAR; INTRAVENOUS EVERY 2 HOUR PRN
Status: DISCONTINUED | OUTPATIENT
Start: 2021-01-01 | End: 2021-01-01

## 2021-01-08 NOTE — PROGRESS NOTES
Pt here for Q 3 month Leuprolide injection & labs. He speaks [place] Labs drawn easily. 2x2 gauze/ coban wrap to site. Lupron inj administered RUQ abd, tolerated well. 2x2 gauze/ paper tape to site.       Discharged stable to lobby with future ap

## 2021-01-08 NOTE — PROGRESS NOTES
Cancer Center Progress Note    Patient Name: Huy Bowman   YOB: 1935   Medical Record Number: [id number]   Attending Physician: Sara Guillaume M.D.      Chief Complaint:  Prostate cancer    History of Present Illness:  Cancer history: diabetes mellitus ([facility] [address]    • Prostate cancer ([facility] [address] 2013 in [facility]       Past Surgical History:  Past Surgical History:   Procedure Laterality Date   • AMPUTATION LOW LEG,CIRCULAR Left     below the knee amputation   • CATH PERIPHERAL STENT Right     lower Active member of club or organization: Not on file        Attends meetings of clubs or organizations: Not on file        Relationship status: Not on file      Intimate partner violence        Fear of current or ex partner: Not on file        Emotionally ab Misc, 1 each., Disp: , Rfl:   •  MetFORMIN HCl 850 MG Oral Tab, Take 850 mg by mouth 2 (two) times daily with meals.   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   Correlating his most recent CT and bone scan  it appears his progression is isolated to 2-3 bone le

## 2021-01-29 NOTE — PROGRESS NOTES
Nursing Consultation Note  Patient: José Mancia  YOB: 1935  Age: 80year old  Radiation Oncologist: Dr. Mayo Talbot  Referring Physician: [email or web address]  Consult Date: 1/29/2021      Chemotherapy: Yes  Labs: nightly. • NEOMYCIN-POLYMYXIN-HC OT 1 drop by Each ear route daily. • ClonazePAM 0.5 MG Oral Tab Take 1 tablet by mouth nightly as needed. 3   • gabapentin 300 MG Oral Cap Take 1 capsule by mouth 2 (two) times daily.   3   • Insulin Syringe 31G X 5 IRIDEX Right 12/9/2019    Performed by Shireen Vu MD at [address] [place] [address] MAIN OR   • EYE VITRECTOMY WITH INDIRECT/ DIRECT LASER/ IRIDEX Right 7/15/2019    Performed by Shireen Vu MD at [address] [place] [address] MAIN OR       Social History    Socioeconomic History      Marital status: M Concern: Not Asked        Stress Concern: Not Asked        Weight Concern: Not Asked        Special Diet: Not Asked        Back Care: Not Asked        Exercise: Not Asked        Bike Helmet: Not Asked        Seat Belt: Not Asked        Self-Exams: Not Aske why his b/p is high. He is due to  [place] and Tramadol today. We also talked about taking a stool softener. Eating and drinking well. I translated for Dr. Aida Ko.  I explained to the patient that today he would meet Dr. Aida Ko but while being

## 2021-01-29 NOTE — CONSULTS
[facility]    PATIENT'S NAME: Judith Gay   RADIATION ONCOLOGIST: Mitchel Sears.  Aida Ko MD   PATIENT ACCOUNT #: [de-identified] LOCATION: [address] [place] [address] RECORD #: [id number] YOB: 1935   CONSULTATION DATE: 01/29/2021 sites.    PHYSICAL EXAMINATION:    VITAL SIGNS:  On exam today, the patient is noted to have a blood pressure of 187/77, pulse 66, respiratory rate of 18, and a temperature of 97.0.   He has a pain score of 5, which is intermittent and located in the 2 site Nevertheless, he may experience some loose stools, diarrhea, nausea, vomiting, or other GI-related distress as a result of the treatment at T8.   The posterior rib lesion should be tolerated well without much in the way of morbidity apart from perhaps some

## 2021-02-07 NOTE — ED PROVIDER NOTES
Patient Seen in: [facility] AND [facility] Emergency Department      History   Patient presents with:  Trauma    Stated Complaint: Back Pain    HPI/Subjective:   HPI    55-year-old male with history of bladder cancer, metastatic prostate cancer, here with compla Drug use: No             Review of Systems   Constitutional: Negative for fever. HENT: Negative for congestion. Eyes: Negative for visual disturbance. Respiratory: Negative for shortness of breath. Cardiovascular: Negative for chest pain.    Shay Pereira oxygenation.     PROCEDURES:  none    DIAGNOSTICS:   Labs:  Recent Results (from the past 24 hour(s))   BASIC METABOLIC PANEL (8)    Collection Time: 02/07/21 10:23 AM   Result Value Ref Range    Glucose 218 (H) 70 - 99 mg/dL    Sodium 135 (L) 136 - 145 mmo 17.5 g/dL    HCT 34.7 (L) 39.0 - 53.0 %    MCV 81.3 80.0 - 100.0 fL    MCH 26.7 26.0 - 34.0 pg    MCHC 32.9 31.0 - 37.0 g/dL    RDW-SD 40.2 35.1 - 46.3 fL    RDW 13.8 11.0 - 15.0 %    .0 150.0 - 450.0 10(3)uL    Neutrophil Absolute Prelim 5.99 1.50 re-checked within 1 week was provided. Medical Record Review: I personally reviewed available prior medical records for any recent pertinent discharge summaries, testing, and procedures, and reviewed those reports. Complicating Factors:  The patient

## 2021-02-09 NOTE — TELEPHONE ENCOUNTER
FATMATA Jc I scheduled labs, infusion and follow-up. Labs and infusion are set 2/16/21. Thank you.  [place]

## 2021-02-16 NOTE — PROGRESS NOTES
[facility] Radiation Treatment Management Note 1-5    Patient:  Mono Crandall  Age:  80year old  Visit Diagnosis:    1.  Bone metastasis ([address]      Primary Rad/Onc:  Dr. Roney Fritz    Site Delivered Dose (cGy) Prescribed

## 2021-02-16 NOTE — PROGRESS NOTES
Patient here for labs and to start [facility] today. Patient is [place] speaking, prefers to have Son translate. Medication profile reviewed with patient Son.   Patient stated he had partial tooth removed from Dentist last week, stated he only had partial too

## 2021-02-16 NOTE — PROGRESS NOTES
Cancer Center Progress Note    Patient Name: Yoni Olivo   YOB: 1935   Medical Record Number: [id number]   Attending Physician: Abdoulaye Morrison M.D.      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 , Disp: , Rfl:   •  ClonazePAM 0.5 MG Oral Tab, Take 1 tablet by mouth nightly as needed. , Disp: , Rfl: 3  •  gabapentin 300 MG Oral Cap, Take 1 capsule by mouth 2 (two) times daily. , Disp: , Rfl: 3  •  Insulin Syringe 31G X 5/16\" ANIONGAP 9 02/16/2021    GFRNAA 74 02/16/2021    GFRAA 85 02/16/2021    CA 9.3 02/16/2021    OSMOCALC 293 02/16/2021    ALKPHO 150 (H) 02/16/2021    AST 11 (L) 02/16/2021    ALT 10 (L) 02/16/2021    BILT 0.3 02/16/2021    TP 7.8 02/16/2021    ALB 3.3 (L) 0

## 2021-02-23 NOTE — PROGRESS NOTES
[facility] Radiation Treatment Management Note 6-10    Patient:  Mono Crandall  Age:  80year old  Visit Diagnosis:    1.  Bone metastasis ([address]      Primary Rad/Onc:  Dr. Roney Fritz    Site Delivered Dose (cGy) Prescribed

## 2021-02-25 NOTE — PROGRESS NOTES
Medication Education Record: IV Therapy    Learner:  Patient and Family Member    Barriers / Limitations:  Language    Psychosocial Assessment:  patient psychosocial response appropriate    Diagnosis:   Prostate cancer    IV Cancer Treatment Name(s): Victorina minimize this from occurring.     Recommended Anti-nausea medications (as directed by your provider):  Prochloperazine (Compazine) 10mg every 8 hours  Take as needed    Helpful hints during cancer treatment:    Diet:  o Avoid greasy or spicy foods on days s contact the triage nurse for further instructions. Skin Care  o Avoid direct sunlight.  o Wear a broad-spectrum sunscreen with an SPF of 30 or higher on any skin exposed to the sun. Re-apply every 2 hours if in the sun and after bathing or sweating.   o F the clinic or at home, the following precautions must be taken to lessen any exposure to the medication. Handling Body Waste:   1. Caregivers must wear gloves if exposed to the patient’s blood, urine, stool or vomit.   Dispose of the used gloves after addition, menstrual cycles can become irregular during and after treatment, so you may not know if you are at a time in your cycle when you could become pregnant or if you are actually pregnant.       Cancer treatment education, including treatment plan, barreto

## 2021-02-25 NOTE — PROGRESS NOTES
02/25: [facility] informed by PA/Jamie that the pt may benefit from a bedside commode due to diarrhea and limited mobility related to his previous Below Knee Amputation. An order has been placed for \"commode with back\".  [facility] sent order, facesheet, and progress note

## 2021-02-25 NOTE — PATIENT INSTRUCTIONS
Medication Education Record: IV Therapy    Learner:  Patient and Family Member    Barriers / Limitations:  Language    Psychosocial Assessment:  patient psychosocial response appropriate    Diagnosis:   Prostate cancer    IV Cancer Treatment Name(s): Victorina minimize this from occurring.     Recommended Anti-nausea medications (as directed by your provider):  Prochloperazine (Compazine) 10mg every 8 hours  Take as needed    Helpful hints during cancer treatment:    Diet:  o Avoid greasy or spicy foods on days s contact the triage nurse for further instructions. Skin Care  o Avoid direct sunlight.  o Wear a broad-spectrum sunscreen with an SPF of 30 or higher on any skin exposed to the sun. Re-apply every 2 hours if in the sun and after bathing or sweating.   o F the clinic or at home, the following precautions must be taken to lessen any exposure to the medication. Handling Body Waste:   1. Caregivers must wear gloves if exposed to the patient’s blood, urine, stool or vomit.   Dispose of the used gloves after addition, menstrual cycles can become irregular during and after treatment, so you may not know if you are at a time in your cycle when you could become pregnant or if you are actually pregnant.

## 2021-02-25 NOTE — PROGRESS NOTES
Pt here for C1D1 Keytruda. Arrives Ambulating with cane, accompanied by Family member , son. Pt and son speak primarily Portuguese. Preferred [facility] interpreters [place] and Andrea Miller, [address]     Patient reports possible pregnancy since last therapy cycle: Not Myra members at home that speak/read English and will be able to read the printed instructions in [place].

## 2021-02-26 NOTE — TELEPHONE ENCOUNTER
Mario Hui, Patient's daughter called you back, however, you must have been on the phone. Can you please call the daughter back regarding her father's condition. Thank you.  [place]

## 2021-02-26 NOTE — TELEPHONE ENCOUNTER
Toxicities: C1 D1 Pembrolizumab on 2/25/2021     - Lashanda # [id number]    I attempted to reach Justino to see how he is feeling after his first treatment yesterday. No answer. I left a voice mail message asking him to please return my call.

## 2021-02-26 NOTE — TELEPHONE ENCOUNTER
Hai Mendes called back. She said her father is doing well. He did feel tired today after treatment yesterday. He took a 3 hour nap. He is eating and drinking well. No other side effects at this time.  I encouraged her to make sure he is eating 5-6 small, prote

## 2021-03-01 NOTE — PATIENT INSTRUCTIONS
Follow up with Dr. Marbin Forrest as directed. Follow up with Dr. Kacie Ng as needed. Call [phone] if you need to speak to a radiation nurse.

## 2021-03-03 NOTE — PROGRESS NOTES
[facility]    PATIENT'S NAME: Sean November   RADIATION ONCOLOGIST: Terrell Leal.  Humberto Chiu MD   PATIENT ACCOUNT #: [de-identified] LOCATION: [address] [place] [address] RECORD #: [id number] YOB: 1935   DATE: 03/01/2021       RADIATION technique and 18 MV photons. These treatments all began on 02/15/2021 and completed on 03/01/2021 for a total of 14 elapsed days during which he received all 10 prescribed radiation treatments.     TREATMENT SUMMARY:  The patient was treated palliatively f

## 2021-03-18 NOTE — PROGRESS NOTES
Pt here for Horacio Cao. Arrives Ambulating with cane, accompanied by self      Pt and son speak primarily Fijian. Preferred [facility] interpreters Teresa Whaley.  For next appointment, son prefers Sophie Hamm to be one day late on appt already scheduled for his Lupron

## 2021-03-18 NOTE — PROGRESS NOTES
To lab with family member to translate as needed. xgeva every 4 weeks, most recent calcium 9.3 with no dental concerns/invasive procedure given or planned. Administered to left arm, no bleeding noted, site left open to air.   PIV established with brisk blo

## 2021-03-18 NOTE — PROGRESS NOTES
Cancer Center Progress Note    Patient Name: Verneta Paget   YOB: 1935   Medical Record Number: [id number]   Attending Physician: Rashard Syed M.D.      Chief Complaint:  Prostate cancer    History of Present Illness:  Cancer history: is good. No diarrhea, abdominal pain, CP, SOB, or new skin rash/pruritis.       Completed Covid vaccine series      Performance Status:  ECOG 0  Past Medical History:  Past Medical History:   Diagnosis Date   • Bladder cancer ([facility]    • Bone cancer ([facility] Alcohol/week: 0.0 standard drinks      Drug use: No      Sexual activity: Not on file    Other Topics      Concerns:         Service: Not Asked        Blood Transfusions: Not Asked        Caffeine Concern: Yes          1 cup of coffee daily hours as needed for Pain., Disp: , Rfl:   •  traMADol HCl 50 MG Oral Tab, Take 50 mg by mouth every 6 (six) hours as needed for Pain., Disp: , Rfl:   •  Insulin Glargine (BASAGLAR KWIKPEN SC), Inject 32 Units into the skin nightly., Disp: , Rfl:   •  Rollen Conception peripheral lymphadenopathy   Chest: Symmetric expansion, nonlabored breathing  Cardiovascular: Regular with palpable distal pulses   Abdomen: Soft, non tender. Extremities: No edema. Neurological: 5/5 motor x4.         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 Received today. –continue ADT with Lupron     MDM: high risk. Prostate cancer metastatic active treatment      VIOLET Olsen        Seen and examined with HAO Reyes.   Patient with metastatic cast

## 2021-04-09 NOTE — PROGRESS NOTES
Cancer Center Progress Note    Patient Name: Dee Thomas   YOB: 1935   Medical Record Number: [id number]   Attending Physician: John Ramos M.D.      Chief Complaint:  Prostate cancer    History of Present Illness:  Cancer history: sweats unintentional weight loss. His energy level is good. No diarrhea, abdominal pain, CP, SOB, or new skin rash/pruritis.       Completed Covid vaccine series      Performance Status:  ECOG 0  Past Medical History:  Past Medical History:   Diagnosis Cy Sexual Activity      Alcohol use: No        Alcohol/week: 0.0 standard drinks      Drug use: No      Sexual activity: Not on file    Other Topics      Concerns:         Service: Not Asked        Blood Transfusions: Not Asked        Caffeine Concern Tab, Take 1 tablet by mouth every 6 (six) hours as needed for Pain., Disp: , Rfl:   •  traMADol HCl 50 MG Oral Tab, Take 50 mg by mouth every 6 (six) hours as needed for Pain., Disp: , Rfl:   •  Insulin Glargine (BASAGLAR KWIKPEN SC), Inject 32 Units into supple. Lymphatics: There is no palpable peripheral lymphadenopathy   Chest: Symmetric expansion, nonlabored breathing  Cardiovascular: Regular with palpable distal pulses   Abdomen: Soft, non tender. Extremities: No edema. Neurological: 5/5 motor x4. his castrate resistant bone metastasis. –continue ADT with Lupron     MDM: high risk.  Prostate cancer metastatic active treatment      John Ramos MD

## 2021-04-09 NOTE — PROGRESS NOTES
Pt here for Q 3 month Leuprolide injection, Xgeva & labs. He will also see dr and have [place] today  He speaks Algerian. Daughter in lobby - she is aware of his appts, reviewed with her    PIV placed, labs drawn and sent. Capped, flushed and clamped.  Se

## 2021-04-09 NOTE — PROGRESS NOTES
Pt here for C3 Keytruda. Arrives Ambulating with cane,  Pt speaks primarily Algerian.   Preferred [facility] interpreters [employer]     Patient reports possible pregnancy since last therapy cycle: Not Applicable    Modifications in dose or schedule: No  Pt to infusion f

## 2021-04-30 NOTE — PROGRESS NOTES
Pt here for C4 Keytruda. Arrives In wheelchair  Pt speaks primarily Turkish. Patient reports possible pregnancy since last therapy cycle: Not Applicable    Modifications in dose or schedule: No  Pt to infusion from md exam, lab results appropriate.

## 2021-04-30 NOTE — PROGRESS NOTES
Cancer Center Progress Note    Patient Name: Konrad Gallagher   YOB: 1935   Medical Record Number: [id number]   Attending Physician: Nicky Aguilar M.D.      Chief Complaint:  Prostate cancer    History of Present Illness:  Cancer history: any urinary frequency changes. He denies any fevers chills night sweats unintentional weight loss. His energy level is good. No diarrhea, abdominal pain, CP, SOB, or new skin rash/pruritis. Completed Covid vaccine series.       Performance Status:  ECO Concern: Yes          1 cup of coffee daily        Occupational Exposure: Not Asked        Hobby Hazards: Not Asked        Sleep Concern: Not Asked        Stress Concern: Not Asked        Weight Concern: Not Asked        Special Diet: Not Asked        Back Units into the skin nightly., Disp: , Rfl:   •  NEOMYCIN-POLYMYXIN-HC OT, 1 drop by Each ear route daily. , Disp: , Rfl:   •  ClonazePAM 0.5 MG Oral Tab, Take 1 tablet by mouth nightly as needed. , Disp: , Rfl: 3  •  gabapentin 300 MG Oral Cap, Take 1 capsul motor x4.         Laboratory:  CBC:    Lab Results   Component Value Date    WBC 5.9 04/30/2021    WBC 5.9 04/09/2021    WBC 4.9 03/18/2021     Lab Results   Component Value Date    HGB 10.8 (L) 04/30/2021    HGB 11.2 (L) 04/09/2021    HGB 11.5 (L) 03/18/20 HAO Reyes agree with and edited above. Metastatic prostate cancer MSI high BRCA2 mutation TMB 39 good response to pembrolizumab however rising PSA will monitor continue pembrolizumab on exam comfortable nonlabored respirations.   We also the option of a PA

## 2021-05-18 NOTE — ED QUICK NOTES
Discharge instructions to daughter on phone, pt hard of hearing and Kazakh speaking, pt was unable to understand the interpretalk

## 2021-05-18 NOTE — ED PROVIDER NOTES
Patient Seen in: [facility] AND [facility] Emergency Department    History   Patient presents with:  Fatigue      HPI    68-year-old male presents the ER with complaint of fatigue.   Patient states he been feeling weak now for the past 2 weeks over his he got his l Substance and Sexual Activity      Alcohol use: No        Alcohol/week: 0.0 standard drinks      Drug use: No    Other Topics      Concerns:        Caffeine Concern: Yes          1 cup of coffee daily      ROS  All pertinent positives for the review of sys Cervical back: Normal range of motion and neck supple. Skin:     General: Skin is warm and dry. Neurological:      Mental Status: He is alert and oriented to person, place, and time. Deep Tendon Reflexes: Reflexes are normal and symmetric. CHEST AP PORTABLE  (CPT=71045)    Result Date: 5/18/2021  CONCLUSION:  1. No acute airspace disease. Mild bibasilar scarring/atelectasis.     Dictated by (CST): Jane Briones MD on 5/18/2021 at 3:04 PM     Finalized by (CST): Jane Briones MD on 5/18/202 [facility]    Disposition:  Discharge    Follow-up:  Dajuan Maria    Schedule an appointment as soon as possible for a visit  If symptoms worsen      Medications Prescribed:  Current Discharge Medication List

## 2021-05-18 NOTE — CM/SW NOTE
Spoke with patient's granddaughter Shira Rand at [phone] regarding patient and the possible need for additional services. [facility], referrals can be sent out.     Shira Rand stated that a Nurse Practitioner comes to see

## 2021-05-21 PROBLEM — D50.9 IRON DEFICIENCY ANEMIA: Status: ACTIVE | Noted: 2021-01-01

## 2021-05-21 NOTE — PROGRESS NOTES
Cancer Center Progress Note    Patient Name: Franny Marr   YOB: 1935   Medical Record Number: [id number]   Attending Physician: Eddie Rajan M.D.      Chief Complaint:  Prostate cancer    History of Present Illness:  Cancer history: overall. +Fatigue that is not any better. Went to ER on 5/18 for worsening fatigue without any acute abnormalities found except possible UTI. Oral abx given at home. Does have some bony pain to right back and right upper arm/shoulder.  Taking norco or Tyl smoking in his 19's    Substance and Sexual Activity      Alcohol use: No        Alcohol/week: 0.0 standard drinks      Drug use: No      Sexual activity: Not on file    Other Topics      Concerns:         Service: Not Asked        Blood Transfusio HYDROcodone-acetaminophen 5-325 MG Oral Tab, Take 1 tablet by mouth every 6 (six) hours as needed for Pain., Disp: , Rfl:   •  traMADol HCl 50 MG Oral Tab, Take 50 mg by mouth every 6 (six) hours as needed for Pain., Disp: , Rfl:   •  Insulin Glargine (BAS is no palpable peripheral lymphadenopathy   Chest: Symmetric expansion, nonlabored breathing  Cardiovascular: Regular with palpable distal pulses   Abdomen: Soft, non tender. Extremities: No edema. Neurological: 5/5 motor x4.         Laboratory:  CBC: done with subsequent follow-up; can consider switch to PARP inhibitor if PD seen. –Denosumab as of January 2021, given his castrate resistant bone metastasis. –Continue ADT with Lupron. MDM: high risk.  Prostate cancer metastatic active treatment

## 2021-05-21 NOTE — PROGRESS NOTES
Pt here for C4 Keytruda and xgeva.   Arrives In wheelchair  Pt speaks primarily Palauan and very [place]    Patient reports possible pregnancy since last therapy cycle: Not Applicable    Modifications in dose or schedule: No  Pt to infusion from md exam, lab re

## 2021-05-21 NOTE — TELEPHONE ENCOUNTER
Spoke with son Kristi Munoz regarding iron deficiency with iron sat of 4, ongoing anemia. May be contributing to fatigue currently. Will plan for IV repletion and call to schedule once authorized.  IN the meantime, can start daily oral iron supplementation and

## 2021-05-31 NOTE — ED PROVIDER NOTES
Patient Seen in: [facility] AND [facility] Emergency Department      History   Patient presents with:  Headache    Stated Complaint: headache     HPI/Subjective:   HPI    80year old male with multiple medical issues including DM, HTN, HLD, prostate cancer with 1204 14   Temp 05/31/21 1201 98.9 °F (37.2 °C)   Temp src 05/31/21 1201 Oral   SpO2 05/31/21 1204 95 %   O2 Device 05/31/21 1204 None (Room air)       Current:/71   Pulse 71   Temp 98.9 °F (37.2 °C) (Oral)   Resp 16   SpO2 98%         Physical Exam is cooperative.            ED Course     Labs Reviewed   BASIC METABOLIC PANEL (8) - Abnormal; Notable for the following components:       Result Value    Glucose 391 (*)     Calculated Osmolality 299 (*)     All other components within normal limits   PROT Intravenous Given 5/31/21 1243)   sodium chloride 0.9% IV bolus 1,000 mL (0 mL Intravenous Stopped 5/31/21 1400)     Pt states pain gone after medication. No acute intracranial abnormality on CT, no acute neuro deficit.       Disposition and Plan     Clinic

## 2021-05-31 NOTE — ED INITIAL ASSESSMENT (HPI)
Headache x 2 weeks without relief. Daughter also states that he is \"out of it lately\". nausea with one episode of vomiting     Patient of dr Monica Rich, getting chemo for prostate cancer.      20g IV to R wrist from EMS

## 2021-05-31 NOTE — ED QUICK NOTES
Reviewed discharge information with patient. Patient verbalized understanding, no further questions or complaints at this time. Patient is alert and orientated x4, in no apparent distress, escorted to lobby via wheelchair. IVs discontinued.  Instructed ashley

## 2021-05-31 NOTE — ED QUICK NOTES
Attempted to use language line, patient very hard of hearing. Daughter Rudy Patiada called and got information for triage assessment.  States she will be on her way to hospital.

## 2021-06-04 NOTE — TELEPHONE ENCOUNTER
Confirmed with Ryan Mckenzie U. 52. was delivered on 5/26/21. Call to son Rikki Chen and he confirms they have received the medication but his father has not started until he meets with Dr Daisy Silva.  Instructed to please have him bring medication in with hi

## 2021-06-07 NOTE — PROGRESS NOTES
Medication Education Record: Oral Therapy    Learner:  Patient and Family Member    Barriers / Limitations:  Communication barrier    Psychosocial Assessment:  patient psychosocial response appropriate    Diagnosis:   Metastatic Prostate cancer      Medica peanut butter, cheese)  o If nauseated, try dry foods, such as toast, crackers or pretzels; light or bland foods, such as applesauce or oatmeal.    Fluid intake:  o Drink 8-10 cups of liquid a day and take a water bottle wherever you go.   Any fluid is acce tiredness, thirst, dry mouth, dark and decreased amount of urine  • Diarrhea – not controlled with Imodium AD or more than 6 episodes in 24 hours  • Constipation –no bowel movement x 3 days, no response to stool softeners or laxatives  • Mouth sores, sore immediately – do not use for anything else. 4. Wash hands thoroughly before and after contact with this medication. 5. Women of child bearing age, pregnant women or women who are nursing should not handle this medication or any contaminated waste.      6. sealed plastic bag until they can be washed. 3. Absorbable undergarments, or any other items contaminated with chemotherapy, should be placed in a sealed plastic bag for disposal, separate from other trash.   4. Toilets should be flushed twice with the li

## 2021-06-07 NOTE — PROGRESS NOTES
Cancer Center Progress Note    Patient Name: Verneta Paget   YOB: 1935   Medical Record Number: [id number]   Attending Physician: Rashard Syed M.D.      Chief Complaint:  Prostate cancer    History of Present Illness:  Cancer history: follow-up continues to have some fatigue. He has some increase in back pain is able to complete his activities of daily living. Per family he does sleep a lot and his appetite is somewhat poor. Completed Covid vaccine series.       Performance Status:  E Concern: Yes          1 cup of coffee daily        Occupational Exposure: Not Asked        Hobby Hazards: Not Asked        Sleep Concern: Not Asked        Stress Concern: Not Asked        Weight Concern: Not Asked        Special Diet: Not Asked        Back hours as needed for Pain., Disp: , Rfl:   •  Insulin Glargine (BASAGLAR KWIKPEN SC), Inject 32 Units into the skin nightly., Disp: , Rfl:   •  NEOMYCIN-POLYMYXIN-HC OT, 1 drop by Each ear route daily. , Disp: , Rfl:   •  ClonazePAM 0.5 MG Oral Tab, Take 1 t palpable distal pulses   Abdomen: Soft, non tender. Extremities: No edema. Neurological: 5/5 motor x4.         Laboratory:  CBC:    Lab Results   Component Value Date    WBC 5.7 05/31/2021    WBC 6.1 05/21/2021    WBC 5.2 05/18/2021     Lab Results   Com metastasis. –Continue ADT with Lupron. MDM: high risk.  Prostate cancer metastatic active treatment      John Paul Hough MD

## 2021-06-07 NOTE — PROGRESS NOTES
Justino arrived to infusion following MD visit via wheelchair for Feraheme 1 of 2. Patient's daughter to accompany due to confusion. Reports he has been increasingly fatigued recently. Indications for use and potential side effects reviewed.   PIV est confusion

## 2021-06-07 NOTE — PROGRESS NOTES
Medication Education Record: Oral Therapy    Learner:  Patient and Family Member    Barriers / Limitations:  Cognitive limitations, Communication barrier and Language    Psychosocial Assessment:  patient psychosocial response appropriate    Diagnosis:   Me meals  o Choose high calorie/high protein foods (chicken, hard cooked eggs, peanut butter, cheese)  o If nauseated, try dry foods, such as toast, crackers or pretzels; light or bland foods, such as applesauce or oatmeal.    Fluid intake:  o Drink 8-10 cups anti-nausea medications: Unable to drink for 24 hours or have signs of dehydration: tiredness, thirst, dry mouth, dark and decreased amount of urine  • Diarrhea – not controlled with Imodium AD or more than 6 episodes in 24 hours  • Constipation –no bowel medication is appropriately labeled. 2. Only patients who need chemotherapy should take or touch the medication. 3. If caregivers are involved, caregivers should wear gloves when administering the medication to protect against exposure.   Discard used g soap and water. 2. Any sheets or clothes soiled with the bodily fluids should be machine washed twice in hot water with regular laundry detergent. Do not wash soiled garments with hands.   If the soiled garments cannot be washed right away, place them in

## 2021-06-14 NOTE — PATIENT INSTRUCTIONS
For pain:  -CONTINUE Hydrocodone/Acetaminophen 5/325mg- take 1 tablet every 6 hours as needed for pain  -STOP Clonazepam at bedtime due to increased drowsiness    For Constipation:  -START Miralax (over the counter)- take 1 cap full of powder mixed in 6-8

## 2021-06-14 NOTE — CONSULTS
Palliative Care Consult Note     Patient Name: Huy Bowman   YOB: 1935   Medical Record Number: [id number]   Date of visit: 6/14/2021     Chief Complaint/Reason for Visit:  Patient presents with:  Palliative Care    Reason for Cons Clonazepam at bedtime- dtr reports daytime drowsiness    Nausea:  -Compazine controls this symptom    Decreased appetite:  -\"too tired\" to eat  -Drinks 1 Glucerna shake/day    Frequent falls:  -no injuries sustained per pt/family report  -Pt denies ever Alcohol use: No        Alcohol/week: 0.0 standard drinks      Drug use: No      Palliative Care Social History:    Marital Status:   Children: 10  Living Situation: Lives with wife; has close knot family; kids check in on pt and bring him to/from ap Giancarlo Vincent goes in to effect in the event that they are not able to make healthcare decisions for themself, unless otherwise specified.  I had an extensive conversation with patient regarding the [employer] document and provided patien Capsule Delayed Release Take 40 mg by mouth. • Warfarin Sodium 2.5 MG Oral Tab Take 2.5 mg by mouth daily. Review of Systems:  Review of Systems   Constitutional: Positive for malaise/fatigue and weight loss.         Decreased appetite   HENT: lower leg: No edema. Comments: LLE BKA; LLE prosthesis noted     Skin:     General: Skin is warm and dry. Coloration: Skin is not pale. Neurological:      General: No focal deficit present.       Mental Status: He is alert and oriented to person appetite  -Schedule meal times  -Increase Glucerna to 3 shakes/day  -Continue to trend weights  -Mirtazapine not indicated r/t pt being on Prozac       Goals of care counseling/discussion  -Continue supportive medical treatments; pt plans to continue pursu

## 2021-06-14 NOTE — PROGRESS NOTES
Justino arrived to infusion via wheelchair for Feraheme 2 of 2. Patient's daughter to accompany due to age, language and [place]. PIV established to right wrist.Feraheme administered over 15 minutes with patient in reclined position.    Feraheme given wit

## 2021-06-17 NOTE — TELEPHONE ENCOUNTER
Oral chemo check. On Olaparib for metastatic prostate cancer, started on June 8th. Checking for any side effects or problems. LM for son Kristi Munoz to please call clinic RN back with a status report on his father using Language Line ID for [place] # [id number].

## 2021-06-18 NOTE — TELEPHONE ENCOUNTER
SW informed of the pt/family request for evaluation of free services at home. SW initiated referral to [facility] to evaluate for eligible programs.  Referral sent via secure email to   [email or web address]

## 2021-06-19 NOTE — TELEPHONE ENCOUNTER
Called Dr Jackie Kennedy office requesting fiducial markers, as pt has an appointment 5/5.
Gilma Huynh from Oncology called to request that the fiducial markers be placed. Pt has appt for 5/5/17 for just a 4 week check. Your 11:00 cysto for Friday cxl'd so we could do it in that spot but he would have to start antibiotics tomorrow, 5/4/17.   Please ad
It appears this was handled. Pt has appt scheduled today.
Staff please have the patient follow-up in a regular office visit on Friday. I reviewed the radiation oncology notes and the thinking is that the radiation will not start for 2 months at least after his hormonal ablation is started.   He received his first
d/c d/w Dr. Acevedo

## 2021-07-02 NOTE — PROGRESS NOTES
Cancer Center Progress Note    Patient Name: Glory Schmitt   YOB: 1935   Medical Record Number: [id number]   Attending Physician: Kari Rothman M.D.      Chief Complaint:  Prostate cancer    History of Present Illness:  Cancer history: history:  He returns for routine follow-up continues to have some fatigue. He has some increase in back pain is able to complete his activities of daily living. Per family he does sleep a lot and his appetite is somewhat poor.   Completed Covid vaccine se Transfusions: Not Asked        Caffeine Concern: Yes          1 cup of coffee daily        Occupational Exposure: Not Asked        Hobby Hazards: Not Asked        Sleep Concern: Not Asked        Stress Concern: Not Asked        Weight Concern: Not Asked 5-325 MG Oral Tab, Take 1 tablet by mouth every 6 (six) hours as needed for Pain., Disp: , Rfl:   •  Insulin Glargine (BASAGLAR KWIKPEN SC), Inject 32 Units into the skin nightly., Disp: , Rfl:   •  NEOMYCIN-POLYMYXIN-HC OT, 1 drop by Each ear route daily. Laboratory:  CBC:    Lab Results   Component Value Date    WBC 3.5 (L) 07/02/2021    WBC 7.4 06/07/2021    WBC 5.7 05/31/2021     Lab Results   Component Value Date    HGB 9.9 (L) 07/02/2021    HGB 9.3 (L) 06/07/2021    HGB 9.8 (L) 05/31/2021      La

## 2021-07-02 NOTE — PROGRESS NOTES
Palliative Care Follow-Up Note     Patient Name: Doroteo Edwards   YOB: 1935   Medical Record Number: [id number]   Date of visit: 7/2/2021     Chief Complaint/Reason for Visit:  Patient presents with:  Palliative Care    Past Medical H ([facility]     Bone metastasis ([facility]     Iron deficiency anemia       Medical History:  Past Medical History:   Diagnosis Date   • Bladder cancer ([facility]    • Bone cancer ([facility].)    • BPH (benign prostatic hyperplasia)    • Depression    • Diabetes ([facility].)    • Diabe HCPOA/Health Surrogate:    No completed HCPOA documentation on file. His daughter does not think he has completed this. I discussed [address] with pt/dtr today. His wife would act as health surrogate, if needed.      I discussed the pur 1 ML Does not apply Misc 1 Syringe. • Insulin Syringe-Needle U-100 31G X 15/64\" 0.3 ML Does not apply Misc 1 each. • MetFORMIN HCl 850 MG Oral Tab Take 850 mg by mouth 2 (two) times daily with meals.        • Omeprazole 40 MG Oral Capsule Delayed R effort is normal. No respiratory distress. Breath sounds: Normal breath sounds. Abdominal:      General: Bowel sounds are normal. There is no distension. Palpations: Abdomen is soft. Tenderness: There is no abdominal tenderness.  There is n Miralax (over the counter)- take 1 cap full of powder mixed in 6-8 ounces of water daily  -If Miralax id not effective in stimulating a bowel movement at least every other day, then call [facility] for further directions      Nausea  -CONTINUE Prochlorperazine 10 for the patient on the day of the encounter: 35 minutes.

## 2021-07-02 NOTE — PROGRESS NOTES
Pt to infusion for monthly Xgeva, labs and quarterly Eligard. Arrived via wheelchair. Hakeem Gotti, helped translate. Labs collected and injections given as ordered. Pt tolerated well. Discharged to lob to await MD appointment.

## 2021-07-30 NOTE — PROGRESS NOTES
Palliative Care Follow-Up Note     Patient Name: Therese Mayorga   YOB: 1935   Medical Record Number: [id number]   Date of visit: 7/31/2021     Chief Complaint/Reason for Visit:  Patient presents with:  Palliative Care    Past Medical Bone metastasis (HCC)     Iron deficiency anemia       Medical History:  Past Medical History:   Diagnosis Date   • Bladder cancer ([facility].)    • Bone cancer ([facility].)    • BPH (benign prostatic hyperplasia)    • Depression    • Diabetes ([facility].)    • Diabetes mellit Surrogate:    No completed HCPOA documentation on file. His daughter does not think he has completed this. I discussed [address] with pt/dtr today. His wife would act as health surrogate, if needed.      I discussed the purpose of designati 15/64\" 0.3 ML Does not apply Misc 1 each. • MetFORMIN HCl 850 MG Oral Tab Take 850 mg by mouth 2 (two) times daily with meals. • Warfarin Sodium 2.5 MG Oral Tab Take 2.5 mg by mouth daily.            Review of Systems:  Review of Systems   Jasmeet distension. Palpations: Abdomen is soft. Tenderness: There is no abdominal tenderness. There is no guarding. Musculoskeletal:      Cervical back: Normal range of motion and neck supple. Right lower leg: No edema.       Comments: MICHAEL MARTINEZ bowel movement at least every other day, then call [facility] for further directions      Nausea  -CONTINUE Prochlorperazine 10mg- take 1 tablet every 6 hours as needed for nausea      Decreased appetite  -Schedule meal times  -Increase Glucerna to 2-3 shakes/day

## 2021-07-30 NOTE — TELEPHONE ENCOUNTER
lvm with dtr and son to call back to sche rad/onc consult for Monday 8/2 dx Malignant neoplasm of prostate bone mets ok by julian.  kassy

## 2021-07-30 NOTE — PROGRESS NOTES
Cancer Center Progress Note    Patient Name: Hi Skinner   YOB: 1935   Medical Record Number: [id number]   Attending Physician: Irma Alexandre M.D.      Chief Complaint:  Prostate cancer    History of Present Illness:  Cancer history: [id number]*45  AMMY [id number]*79       Interval history:  He returns for routine follow-up continues to have some fatigue.   He is having more back pain in the mid thoracic spine he has some increase in back pain is able to complete his activities of daily livin Not on file    Other Topics      Concerns:         Service: Not Asked        Blood Transfusions: Not Asked        Caffeine Concern: Yes          1 cup of coffee daily        Occupational Exposure: Not Asked        Hobby Hazards: Not Asked        Sl mouth every 6 (six) hours as needed for Pain., Disp: , Rfl:   •  HYDROcodone-acetaminophen 5-325 MG Oral Tab, Take 1 tablet by mouth every 6 (six) hours as needed for Pain., Disp: , Rfl:   •  Insulin Glargine (BASAGLAR KWIKPEN SC), Inject 32 Units into the 3.5 (L) 07/02/2021    WBC 7.4 06/07/2021     Lab Results   Component Value Date    HGB 8.5 (L) 07/30/2021    HGB 9.9 (L) 07/02/2021    HGB 9.3 (L) 06/07/2021      Lab Results   Component Value Date    .0 (L) 07/30/2021    .0 07/02/2021    PLT Number: [id number]   Attending Physician: Ria Nixon M.D.      Chief Complaint:  Prostate cancer    History of Present Illness:  Cancer history:  59-year-old male with a history of multiple medical problems, including insulin-dependent diabetes mellitus, h is able to complete his activities of daily living. Per family he does sleep a lot and his appetite is somewhat poor. Completed Covid vaccine series.       Performance Status:  ECOG 0  Past Medical History:  Past Medical History:   Diagnosis Date   • Blad Not Asked        Hobby Hazards: Not Asked        Sleep Concern: Not Asked        Stress Concern: Not Asked        Weight Concern: Not Asked        Special Diet: Not Asked        Back Care: Not Asked        Exercise: Not Asked        Bike Helmet: Not Asked (Roro Osei SC), Inject 32 Units into the skin nightly., Disp: , Rfl:   •  Insulin Syringe 31G X 5/16\" 0.3 ML Does not apply Misc, U UTD BID, Disp: , Rfl: 3  •  aspirin EC 81 MG Oral Tab EC, Take 81 mg by mouth daily.   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   , Disp: , Rfl:   •  Atorvastatin Calcium 10 MG Oral Tab, Take 10 mg by mouth nightly.  , Disp: , Rfl:   •  Glucose Blood In Vitro Strip, 1 strip., Disp: , Rfl:   •  FLUoxetine HCl 10 MG Oral Cap, Take 10 mg by mouth., Disp: , Rfl: 07/30/2021    ANIONGAP 8 07/30/2021    GFRNAA 84 07/30/2021    GFRAA 97 07/30/2021    CA 8.5 07/30/2021    OSMOCALC 282 07/30/2021    ALKPHO 251 (H) 07/30/2021    AST 26 07/30/2021    ALT 17 07/30/2021    BILT 0.5 07/30/2021    TP 7.0 07/30/2021    ALB 2.5

## 2021-07-30 NOTE — PROGRESS NOTES
Pt here for Myrna Mcintosh & home. He will also see Dr Shan Harden  He speaks [place] and is [place]. Daughter in lobby - she is aware of his appts, reviewed his care with her & she helped to translate for pt  Pt is in good spirits.      Labs drawn 2nd attempt, he tolerated w

## 2021-08-02 NOTE — TELEPHONE ENCOUNTER
donaldom with dtr moris to move rad/onc consult from 8/18 to 8/3 at 130 RN 200PM MD 1305 [facility]

## 2021-08-03 NOTE — PATIENT INSTRUCTIONS
Dr. Tristin Li ordered a new bone scan to be done. Scheduled for 8/11/21 at 9:45 AM.Go to [facility] Main. Drink 16 oz of liquid one hour before bone scan.     Consultation with Dr. Troy Allen scheduled for Monday 8/9 at 7:30 AM.     your MS contin at [facility]

## 2021-08-03 NOTE — CONSULTS
[facility]    PATIENT'S NAME: MOISES, [address] PHYSICIAN: Shannon Mosley. Lisa Rachel MD   CONSULTING PHYSICIAN: Shannon Mosley.  Lisa Rachel MD   PATIENT ACCOUNT#:   [id number]    LOCATION:  [address] [place] [address] 200 RECORD #:   [id number]       DATE Oncology to consider palliative treatment to T5 or other potential sites of disease.       PHYSICAL EXAMINATION:    VITAL SIGNS:  On exam today, the patient is noted to have a blood pressure of 143/82, pulse of 103, respiratory rate of 18 and he is afebrile patient's pain is not being appropriately controlled at this point.   I have given a prescription for MS Contin and feel that the patient may benefit from referral to the palliative care service or pain clinic if this is unable to control his pain to a mean

## 2021-08-03 NOTE — PROGRESS NOTES
Nursing Consultation Note  Patient: Arlet Mims  YOB: 1935  Age: 80year old  Radiation Oncologist: Dr. Yas Foy  Referring Physician: [email or web address]  Consult Date: 8/3/2021      Chemotherapy: No  Labs: Re Oral Tab Take 1 tablet by mouth every 6 (six) hours as needed for Pain. • Insulin Glargine (BASAGLAR KWIKPEN SC) Inject 32 Units into the skin nightly.      • Insulin Syringe 31G X 5/16\" 0.3 ML Does not apply Misc U UTD BID  3   • aspirin EC 81 MG Oral name: Not on file      Number of children: Not on file      Years of education: Not on file      Highest education level: Not on file    Occupational History      Not on file    Tobacco Use      Smoking status: Former Smoker        Packs/day: 2.00        Y Physically Abused:       Sexually Abused:     ECOG:  Grade 3 - Capable of only limited self-care, confined to bed or chair more than 50% of waking hours. Education:  Yes    Are ADL's met? Yes  Does patient feel safe in their environment?   Yes  Care dec Nothing  What makes the pain worse?:  Moving  Current pain medication use?:  Yes  How is the pain interfering with your life?: Yes  How is the pain interfering with your appetite?:  Yes  How is the pain interfering with your physical activity?:  Yes  How i

## 2021-08-09 NOTE — PROGRESS NOTES
RADIATION ONCOLOGY NOTE    DATE OF VISIT: 8/9/2021    DIAGNOSIS : 81 yo with Metastatic castration resistant prostate cancer, currently on systemic therapy , , with symptomatic bone metastases for restaging bone scan shortly  Dear Arely Cosme and mitch Recent Labs   Lab 07/30/21  1159   WBC 2.5*   HGB 8.5*   .0*   NE 1.77            ROS    A 12 Point review of system was obtained and is as above and per HPI and nursing note.     Review of Systems   Constitutional: Positive for appetite change a by mouth 2 (two) times daily with meals. Warfarin Sodium 2.5 MG Oral Tab Take 2.5 mg by mouth daily.            PAIN:  Pain Loc: Back (back, shoulders, abdomen), Pain Score: 0 (pt rates it a 10 when pain medication wears off.), Pain Frequency 2: Inte 98.1 °F (36.7 °C), temperature source Oral, resp. rate 18, height 1.803 m (5' 11\"), SpO2 97 %. PERFORMANCE STATUS 1 , 3/10  PAIN  GENERAL:  Pt is alert and oriented times three in no acute distress.     HEENT:  PERRLA, EOMI,   NECK:  Supple with no  lymph 2/16/2021                PSA      <=4.00 ng/mL 4.43 (H) 0.71 0.15 3.77     Component      Latest Ref Rng & Units 1/8/2021 11/20/2020 9/18/2020 7/29/2020                PSA      <=4.00 ng/mL 2.68 0.84 0.14 0.13     Component      Latest Ref Rng & Units 6/17

## 2021-08-09 NOTE — PROGRESS NOTES
Nursing Consultation Note  Patient: Paulina Wilson  YOB: 1935  Age: 80year old  Radiation Oncologist: Dr. Mary Barajas  Referring Physician: [email or web address]  Consult Date: 8/9/2021      Chemotherapy: No  Labs: Reviewed acetaminophen 500 MG Oral Tab Take 1,000 mg by mouth every 6 (six) hours as needed for Pain. • HYDROcodone-acetaminophen 5-325 MG Oral Tab Take 1 tablet by mouth every 6 (six) hours as needed for Pain.      • Insulin Glargine (BASAGLAR KWIKPEN SC) Injec • CATH PERIPHERAL STENT Right     lower leg       Social History    Socioeconomic History      Marital status:       Spouse name: Not on file      Number of children: Not on file      Years of education: Not on file      Highest education level: N Meetings:       Marital Status:   Intimate Partner Violence:       Fear of Current or Ex-Partner:       Emotionally Abused:       Physically Abused:       Sexually Abused:     ECOG:  Grade 3 - Capable of only limited self-care, confined to bed or chair mor but states when pain medication wears off he gets up to a 10/10. Pt given informational packet on Xofigo. This RN reviewed the process of getting Xofigo administered, as well as how pt needs a CBC drawn beforehand.  Also reviewed that pt will need to take s

## 2021-08-12 NOTE — TELEPHONE ENCOUNTER
This RN spoke to daughter, Zenaida West, regarding patients Xofigo. Zenaida West aware that patient will require a blood transfusion and CBC prior to initiation of Xofigo. Addressed all questions or concerns at this time.

## 2021-08-12 NOTE — TELEPHONE ENCOUNTER
Using Language line interpretor to instruct on need for transfusion of one unit of blood to increase his hg for his xofigo radiation treatments. Message left by  to please contact our office and ask for clinic nurse.  We can call back using christie

## 2021-08-12 NOTE — TELEPHONE ENCOUNTER
Left son (primary contact) a voicemail to call back regarding patients Xofigo plan. Call back number provided.

## 2021-08-13 NOTE — TELEPHONE ENCOUNTER
LM on both mobile and home numbers using Language Line  #[id number]:  Calling to provide instructions on blood transfusion planned. Please call clinic RN back for instructions and appointment. Awaiting call back.

## 2021-08-16 NOTE — PROGRESS NOTES
Pt here for blood transfusion. To receive 1 unit PRBCs. Lab Results   Component Value Date    HGB 8.5 (L) 07/30/2021    HCT 25.8 (L) 07/30/2021      Arlet Mares #[id number] assisted with communication with Pt and daughter.  Pt believes he may have had a

## 2021-08-16 NOTE — TELEPHONE ENCOUNTER
This RN spoke to daughter, Brittany Cornell, regarding hgb level of 9.3 following todays transfusion. Told her that he will need another transfusion this week. Told that someone from infusion will reach out to schedule that.  Addressed all questions during phone tammy

## 2021-08-17 NOTE — TELEPHONE ENCOUNTER
Granddaughter Dileep Adame calling who see's Pt on daily Basis. Reporting PT has been vomiting for 4 days. 1 week loss of appitite  Is not eating.   Taking no liquids  Back pain is so bad you can touch him.     plz call Dileep Wendi  [phone]

## 2021-08-17 NOTE — ED INITIAL ASSESSMENT (HPI)
Pt to ED via EMS from home for nausea and vomiting x4 days and constipation for 6 days. EMS reports BG of 484 renae route.  Hx of prostate cancer \"that has spread to the back\" per EMS , pt is on morphine at home for pain, pt currently reports pain to the ba

## 2021-08-17 NOTE — TELEPHONE ENCOUNTER
Toxicities: Olaparib/Lupron    Nausea/Vomiting/Dehydration/Constipation/Abdominal Pain/Back Pain    Nausea: Grade 2/Vomiting: Grade 2(Justino Soto's grand daughter reports that Justino has been having continuous nausea and vomiting x 4 days.  He has

## 2021-08-17 NOTE — ED PROVIDER NOTES
Patient endorsed to me from Dr. Kathryn Zheng pending repeat accucheck. It was 74. Patient was symptomatic of hypoglycemia at this level. He was given some juice. Given that he was diaphoretic an EKG was obtained and was unremarkable.   After some food he was f

## 2021-08-17 NOTE — ED QUICK NOTES
Pt's other son here. Patient and his son provided with discharge instructions. Verbalized understanding for plan of care at home and follow up. All questions/ concerns addressed prior to discharge.  Pt instructed to ensure he eats diner tonight Pt sallieed o

## 2021-08-17 NOTE — ED PROVIDER NOTES
Patient Seen in: [facility] AND [facility] Emergency Department      History   Patient presents with:  Nausea/vomiting  Constipation  Hyperglycemia    Stated Complaint: vomiting, constipation, hyperglycemia    HPI/Subjective:   HPI    68-year-old male with past PERRLA  Neck: Normal range of motion. Neck supple. No tracheal deviation present. CV: s1s2+, RRR, no m/r/g, normal distal pulses  Pulmonary/Chest: CTA b/l with no rales, wheezes. No chest wall tenderness  Abdominal: Nontender. Nondistended. Soft.  Bowel Abnormality         Status                     ---------                               -----------         ------                     CBC W/ DIFFERENTIAL[[id number]]          Abnormal            Final result                 Please vi Impression:  Type 2 diabetes mellitus with hyperglycemia, unspecified whether long term insulin use (HCC)  (primary encounter diagnosis)  Non-intractable vomiting with nausea, unspecified vomiting type  Cancer associated pain     Disposition:  There is no

## 2021-08-20 PROBLEM — S06.5XAA ACUTE SUBDURAL HEMATOMA (HCC): Status: ACTIVE | Noted: 2021-01-01

## 2021-08-20 PROBLEM — S06.5X9A ACUTE SUBDURAL HEMATOMA (HCC): Status: ACTIVE | Noted: 2021-01-01

## 2021-08-20 PROBLEM — R79.1 SUPRATHERAPEUTIC INR: Status: ACTIVE | Noted: 2021-01-01

## 2021-08-20 NOTE — PROGRESS NOTES
Unable to complete med rec at this time. Family will provide a list for us to review & complete later today.

## 2021-08-20 NOTE — PROGRESS NOTES
Report given to ER triage for expected transfer from [facility] via ambulance for physical decline, lethargy, alert x 1, increased generalized weakness, fall at home and hit head this AM.  Concern for urgent r/o neurological deficit vs cardiac vs infect

## 2021-08-20 NOTE — ED INITIAL ASSESSMENT (HPI)
Brought by [facility] from Cancer center. Per EMS pt bumped his head this morning. He went unresponsive at [facility]. .  Snoring resp on arrival. On coumadin  MD at [facility]  Preparing to intubate

## 2021-08-20 NOTE — PROGRESS NOTES
Cancer Center Progress Note    Patient Name: José Mancia   YOB: 1935   Medical Record Number: [id number]   Attending Physician: Mica Holm M.D.      Chief Complaint:  Prostate cancer    History of Present Illness:  Cancer history: [id number]*01  SPEN [id number]*02       Interval history:  Acute care visit and assessment in infusion suite for change in mental status; worsening generalized weakness, new lethargy and intermittent unresponsiveness, FTT at home.  +Fall at home this morning and vascular disease due to secondary diabetes mellitus ([facility]    • Prostate cancer ([facility] 2013 in [facility]       Past Surgical History:  Past Surgical History:   Procedure Laterality Date   • AMPUTATION LOW LEG,CIRCULAR Left     below the knee amputation   • CATH of Transportation (Medical):       Lack of Transportation (Non-Medical):   Physical Activity:       Days of Exercise per Week:       Minutes of Exercise per Session:   Stress:       Feeling of Stress :   Social Connections:       Frequency of Communication Disp: , Rfl:   •  Atorvastatin Calcium 10 MG Oral Tab, Take 10 mg by mouth nightly.  , Disp: , Rfl:   •  Glucose Blood In Vitro Strip, 1 strip., Disp: , Rfl:   •  FLUoxetine HCl 10 MG Oral Cap, Take 10 mg by mouth., Disp: , Rfl:   •  Insulin Syringe 30G X BUNCREA 14.7 08/17/2021    CREATSERUM 0.68 (L) 08/17/2021    ANIONGAP 6 08/17/2021    GFRNAA 86 08/17/2021    GFRAA 100 08/17/2021    CA 7.8 (L) 08/17/2021    OSMOCALC 277 08/17/2021    ALKPHO 251 (H) 07/30/2021    AST 26 07/30/2021    ALT 17 07/30/2021 unresponsive/alert x1, worsening generalized weakness, incontinent of stool. Fall this morning with trauma to head per family. Afebrile, VSS on RA. +Diaphoretic. Hx DMII.   --Patient is alert x 1, lethargic, not intermittently following commands or verbal r

## 2021-08-20 NOTE — H&P
Pulmonary/Critical Care Admission Note    HPI:   Mat  is a 80year old male with chief complaint Patient presents with:  Altered Mental Status    Massiel Luis    Pt is a 79 yo with Bladder CA, castration resistant metastatic prostate can Release Take 1 capsule (40 mg total) by mouth daily. 30 capsule 0   • Olaparib 150 MG Oral Tab Take 300 mg by mouth 2 (two) times a day. 120 tablet 11   • acetaminophen 500 MG Oral Tab Take 1,000 mg by mouth every 6 (six) hours as needed for Pain.      • HY Number of children: Not on file      Years of education: Not on file      Highest education level: Not on file    Tobacco Use      Smoking status: Former Smoker        Packs/day: 2.00        Years: 20.00        Pack years: 40        Types: Cigarettes SPECGRAVITY 1.017   GLUUR 50 *   BILUR Negative   KETUR Trace*   BLOODURINE Small*   PHURINE 6.0   PROUR 100 *   UROBILINOGEN 4.0*   NITRITE Negative   LEUUR Negative   WBCUR 1-5   RBCUR 6-10*   BACUR None Seen   EPIUR Few*       CT BRAIN OR HEAD (72750) Jane Briones MD on 8/20/2021 at 10:24 AM     Finalized by (CST): Jane Briones MD on 8/20/2021 at 10:32 AM                       ASSESSMENT/PLAN:     Fall   C/b massive SDH  Suspect catastrophic neurologic injury  Pupils fixed and dilated  D/w [facility] neuro

## 2021-08-20 NOTE — PROGRESS NOTES
Pt here for blood transfusion. To receive 1 unit PRBCs. Planning xofigo treatment with Radiation oncology and procedure requires hgb to be above 10      Lab Results   Component Value Date    HGB 9.3 (L) 08/18/2021    HCT 27.1 (L) 08/18/2021     Pt arrived

## 2021-08-20 NOTE — ED QUICK NOTES
Report given to ICU RN Angel Chen @ [id number]  Pt ready for transport with ED RN, RT and transportation.

## 2021-08-20 NOTE — RESPIRATORY THERAPY NOTE
Pt received from ER intubated on mechanical ventilator. Pt on the following settings AC 16,500,40%+5. ETT 7.5MM secured at 23 cm. No changes at this time. Rt will continue to monitor patient.

## 2021-08-20 NOTE — ED PROVIDER NOTES
Patient Seen in: [facility] AND [facility] Emergency Department      History   Patient presents with:  Altered Mental Status    Stated Complaint:     HPI/Subjective:   HPI    14-year-old male with past medical history significant for prostate cancer with bony me Back:   : Musculoskeletal: Left BKA. Lymphadenopathy: No sig cervical LAD   Neurological: Responsive only to painful stimuli. Skin: Skin is warm and dry. No rash noted. No erythema.    Psychiatric:    ED Course     Labs Reviewed   BASIC METABOLIC RDW 27.9 (*)     .0 (*)     Lymphocyte Absolute 0.36 (*)     All other components within normal limits   TROPONIN I - Normal   RAPID SARS-COV-2 BY PCR - Normal   CBC WITH DIFFERENTIAL WITH PLATELET    Narrative:      The following orders were created (CPT=71045)    Result Date: 8/20/2021  CONCLUSION:  1. ET tube in the trachea at the level the clavicles. No pneumothorax. 2. Mild patchy bibasilar airspace disease suggesting bibasilar pneumonia and or atelectasis. Follow-up studies are advised.   3. The documenting in patient's chart.      08/20/21  1115 08/20/21  1130 08/20/21  1145 08/20/21  1215   BP: 116/54 113/48 109/52 128/48   Pulse: 88 87 86 84   Resp: 16 16 16 16   Temp:       TempSrc:       SpO2: 99% 98% 99% 100%                          Disposit

## 2021-08-20 NOTE — CONSULTS
NEUROSURGERY CONSULTATION - DR. BRIGGS      HPI:  80year old male with history of DM, HTN, hyperlipidemia, and prostate cancer with bone mets sent to the ER by the [facility] after he started becoming lethargic with AMS andthen became unresponsive   Pe dependent left lateral ventricle. No obstructive hydrocephalus. Intracranial atherosclerosis. Aerated secretions in the left sphenoid sinus. Additional secretions and/or debris in the nasopharynx.   Results of this examination were discussed with the pa patient's imaging, neurological examination and course of treatment were discussed with Dr. Rich Barrow

## 2021-08-20 NOTE — TELEPHONE ENCOUNTER
FCO alerted to the family's request for services at home, likely Palliative Care and home health. Pt has Medicare & Medicaid, lives in [address] [place] [address].  While in preparation for discussion with the pt/family, FCO notified the pt is actually being sent by ambulance to

## 2021-08-20 NOTE — PLAN OF CARE
Problem: Patient/Family Goals  Goal: Patient/Family Long Term Goal  Description: Patient's Long Term Goal: DANGELO due to severity of ilness    Interventions:  - See additional Care Plan goals for specific interventions  Outcome: Progressing  Goal: Patient/F resentment  - Help patient/family identify and examine the situation in which these feelings are experienced  - Help patient/family identify destructive displacement of feelings onto other individuals  - Invite use of sacraments/rituals/ceremonies as appro seizure occurs, turn patient to side and suction secretions as needed  - Reorient patient post seizure  - Seizure pads on all 4 side rails  - Instruct patient/family to notify RN of any seizure activity  - Instruct patient/family to call for assistance wit

## 2021-08-20 NOTE — CONSULTS
[facility]  [facility], [address] [place] [address]  [phone] [address]    Report of Consultation  Rea Mendes Patient Status:  Emergency     3/29/1935 and leading to herniation. I explained that this injury was not survivable. I explained that he was not a candidate for surgical intervention.   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 ([facility]    • Prostate cancer ([facility] 2013 in [facility]       Past Surgical History:   Procedure Laterality Date   • AMPUTATION LOW LEG,CIRCULAR Left     below the knee amputation   • CATH PERIPHERAL STENT Right (Non-Medical):   Physical Activity:       Days of Exercise per Week:       Minutes of Exercise per Session:   Stress:       Feeling of Stress :   Social Connections:       Frequency of Communication with Friends and Family:       Frequency of Social Gather Misc U UTD BID   • Insulin Syringe-Needle U-100 31G X 15/64\" 0.3 ML Does not apply Misc 1 each   • metFORMIN HCl (GLUCOPHAGE) 850 mg, Oral, 2 times daily with meals   • morphINE Sulfate ER (MS CONTIN) 15 mg, Oral, Every 12 hours scheduled   • Olaparib 300 Squamous Epi.  Cells Few (A) None Seen /HPF   POCT Glucose    Collection Time: 08/17/21  4:58 PM   Result Value Ref Range    POC Glucose  119 (H) 70 - 99 mg/dL   POCT Glucose    Collection Time: 08/17/21  5:18 PM   Result Value Ref Range    POC Glucose  73 Metabolic Panel (8)    Collection Time: 08/20/21 10:11 AM   Result Value Ref Range    Glucose 240 (H) 70 - 99 mg/dL    Sodium 128 (L) 136 - 145 mmol/L    Potassium 3.9 3.5 - 5.1 mmol/L    Chloride 97 (L) 98 - 112 mmol/L    CO2 25.0 21.0 - 32.0 mmol/L    An 75.5 %    Lymphocyte % 10.0 %    Monocyte % 12.5 %    Eosinophil % 0.6 %    Basophil % 0.0 %    Immature Granulocyte % 1.4 %   Urinalysis with Culture Reflex    Collection Time: 08/20/21 10:40 AM    Specimen: Urine   Result Value Ref Range    Urine Color Y Modified Allens Test Positive     Puncture Charge Yes     Blood Gas Analyzer [id number] ED         HGBA1C: No results found for: A1C, EAG     Test results/Imaging:   CT BRAIN OR HEAD (65699)    Result Date: 8/20/2021  CONCLUSION:   Extremely large 4.2 cm thic midline which ism not in the typical location of the SVC. This may in part be related to the positioning. Recommend follow-up portable AP view of the chest with true AP positioning. No pneumothorax.   1.   Dictated by (CST): Dimitri Jones MD on 8/20/202 while on anticoagulation  Differential Diagnosis:  Diagnostics:  1. Could consider repeat head CT  2. Could consider holding sedation and performing brain death exam.  Therapeutics:  1. Currently patient is full code. .  2. Recommend palliative consult  3.

## 2021-08-21 NOTE — PLAN OF CARE
Problem: Patient Centered Care  Goal: Patient preferences are identified and integrated in the patient's plan of care  Description: Interventions:  - What would you like us to know as we care for you?   - Provide timely, complete, and accurate informatio airway patency with patient fatigue and changes in neurological status  - Encourage and assist patient to increase activity and self care with guidance from PT/OT  - Encourage visually impaired, hearing impaired and aphasic patients to use assistive/commun guided imagery, reiki, breath work, etc.)  - De-myth guilt and help patient/family identify possible irrational spiritual/cultural beliefs and values  - Explore possibilities of making amends & reconciliation with self, others, and/or a greater power  - Ekaterina [place] and hypoglycemia  - Administer ordered medications to maintain glucose within target range  - Assess barriers to adequate nutritional intake and initiate nutrition consult as needed  - Instruct patient on self management of diabetes  Outcome: Not Progressi

## 2021-08-21 NOTE — SPIRITUAL CARE NOTE
Pt was transferred from ER-Pod 47 in critical condition. Family member was allowed to see pt two at a time. Due to rapid decline in condition  was called. Family wanted their love-one anointed.   called a preist.

## 2021-08-21 NOTE — PLAN OF CARE
Pt transferred to [facility]. Pt did not have any belongings. Family was preciously at bedside.

## 2021-08-21 NOTE — RESPIRATORY THERAPY NOTE
PT  RECEIVED ON THE  VENT  WITH ETT SIZE  7.5 @ 23  AT THE  LIPS  WITH SETTINGS  A/C/  RR 16 /   / PEEP 5/ FIO2  40%.  SX DONE  AND CONTINUE  TO MONITOR THE PT

## 2021-08-23 ENCOUNTER — TELEPHONE (OUTPATIENT)
Dept: PULMONOLOGY | Facility: CLINIC | Age: 86
End: 2021-08-23

## 2021-08-23 ENCOUNTER — TELEPHONE (OUTPATIENT)
Dept: HEMATOLOGY/ONCOLOGY | Facility: HOSPITAL | Age: 86
End: 2021-08-23

## 2021-08-23 NOTE — TELEPHONE ENCOUNTER
FCO reviewing pending referrals. Pt had been sent to the [address] [place] [address] ED following a fall at home on Fri 08/20. Notes indicate the pt was brought to [address] [place] [address] ED and prognosis was grave.  Per [address] [place] [address] staff notes, the family and Spiritual Support were involved, as pt passed kerry

## 2021-08-23 NOTE — TELEPHONE ENCOUNTER
[facility] calling to confirm fax sent this morning for patients death certificate was received. Please refax back or call at [phone] to confirm receipt, thanks.

## 2021-08-23 NOTE — TELEPHONE ENCOUNTER
Judy agreed to fax death certificate to us at [phone]. Explained our fax @ #[phone] is not working. She voiced understanding.

## 2021-08-24 NOTE — TELEPHONE ENCOUNTER
Judy @ [facility] is aware that pt's completed death cert was faxed to the state. Stts fall (line b under cause of death) was not reported to [employer] that they will be calling us.

## 2021-08-25 NOTE — TELEPHONE ENCOUNTER
Dr. Tamir fletcher is already on pt's death certificate. Pls call Jett Carlos @ [facility] Office at #[phone] as requested. Thanks.

## 2021-08-27 ENCOUNTER — APPOINTMENT (OUTPATIENT)
Dept: HEMATOLOGY/ONCOLOGY | Facility: HOSPITAL | Age: 86
End: 2021-08-27
Attending: INTERNAL MEDICINE
Payer: MEDICARE

## 2021-08-27 NOTE — TELEPHONE ENCOUNTER
Per Riki Roman they spoke w/ pt's daughter since fall is an accident & records had to be updated. Stts anticoagulation therapy & prostate ca added to significant conditions contributing to death & manner of death was changed to accident. Dr. Viridiana Ruggiero- Christa Bojorquez.

## 2021-09-20 NOTE — DISCHARGE SUMMARY
[facility] [place] [facility]  Discharge Summary    Berniece Spurling Patient Status:  Inpatient    3/29/1935 MRN [id number]   Location [facility] 2W/SW Attending No att. providers found   Hosp Day # 1 PCP Mary Kate Coronado     Date of Admission: 8

## 2021-09-24 ENCOUNTER — APPOINTMENT (OUTPATIENT)
Dept: HEMATOLOGY/ONCOLOGY | Facility: HOSPITAL | Age: 86
End: 2021-09-24
Attending: INTERNAL MEDICINE
Payer: MEDICARE

## 2025-04-17 NOTE — PROGRESS NOTES
Vazquez Marie is a 80year old male.     HPI:   Patient presents with:  Prostate Cancer: patient presents for 3 mo f/u on prostate ca for eligard inj     80year-old with high risk prostate cancer Epping 5+5 initial PSA 25 diagnosed incidentally on Onset   • Hypertension Father    • Diabetes Father    • Hypertension Mother    • Diabetes Mother    • Cancer Mother    • Cancer Sister       Social History: Smoking status: Former Smoker                                                              Packs/da UNIT/ML Subcutaneous Suspension Inject 10 Units into the skin. Disp:  Rfl:    Insulin Syringe-Needle U-100 31G X 15/64\" 0.3 ML Does not apply Misc 1 each. Disp:  Rfl:    lisinopril 10 MG Oral Tab Take 10 mg by mouth.  Disp:  Rfl:    MetFORMIN HCl 850 MG Or 6

## (undated) DEVICE — STERILE LATEX POWDER-FREE SURGICAL GLOVESWITH NITRILE COATING: Brand: PROTEXIS

## (undated) DEVICE — MEDI-VAC NON-CONDUCTIVE SUCTION TUBING: Brand: CARDINAL HEALTH

## (undated) DEVICE — SUTURE VICRYL 7-0 J566G

## (undated) DEVICE — 27+ VALVED ENTRY SYSTEM (3-CT): Brand: 27+

## (undated) DEVICE — PACK SRG BIOM FULL DRP STRL

## (undated) DEVICE — NON-ADHERENT PAD PREPACK: Brand: TELFA

## (undated) DEVICE — SOLUTION IRR BSS+

## (undated) DEVICE — ELECTRODE,CUTTING,STERILE.24FR: Brand: N.A.

## (undated) DEVICE — ENDOSCOPIC VALVE WITH ADAPTER.: Brand: SURSEAL® II

## (undated) DEVICE — 60 ML SYRINGE CATHETER TIP: Brand: MONOJECT

## (undated) DEVICE — Device

## (undated) DEVICE — ISPAN SF6 SULFUR HEXAFLUORIDEISPAN* SF6 IS A FLUORINATED GREENHOUSE GAS COVERED BY THE KYOTO PROTOCOL AND HAS A GLOBAL WARMING POTENTIAL (GWP) OF 22,200. RESIDUAL ISPAN* SF6 GAS SHOULD BE RECOVERED BY APPROPRIATELY QUALIFIED PERSONNEL AND RECYCLED, RECLAIMED OR DESTROYED IN ACCORDANCE WITH LOCAL ORDINANCES.: Brand: ISPAN

## (undated) DEVICE — 27+® GRIESHABER MAXGRIP® REV DSP FORCEPS: Brand: ALCON GRIESHABER FINESSE 27+ MAXGRIP REVOLUTION

## (undated) DEVICE — EYE PADSSTERILENOT MADE WITH NATURAL RUBBER LATEXSINGLE USE ONLYDO NOT USE IF PACKAGE OPENED OR DAMAGED: Brand: CARDINAL HEALTH

## (undated) DEVICE — LUBRICANT JLY SURGILUBE 2OZ

## (undated) DEVICE — SYRINGE MNJCT 35ML LF STRL LL

## (undated) DEVICE — REM POLYHESIVE ADULT PATIENT RETURN ELECTRODE: Brand: VALLEYLAB

## (undated) DEVICE — PROBE LASER ENDO DISP 25 GA

## (undated) DEVICE — 3M™ MICROFOAM™ SURGICAL TAPE, 2 INCHES X 5 YARDS, 6 ROLLS/CARTON, 6 CARTONS/CASE, 1528-2: Brand: 3M™ MICROFOAM™

## (undated) DEVICE — APPLICATOR COTTON TIP 3 10/PK

## (undated) DEVICE — TOWEL OR BLU 16X26 STRL

## (undated) DEVICE — ENCORE® LATEX MICRO SIZE 7.5, STERILE LATEX POWDER-FREE SURGICAL GLOVE: Brand: ENCORE

## (undated) DEVICE — RETINAL: Brand: MEDLINE INDUSTRIES, INC.

## (undated) DEVICE — COVER SGL STRL LGHT HNDL BLU

## (undated) DEVICE — SOLUTION IRR BTL 250CC NACL

## (undated) DEVICE — Device: Brand: MEDEX

## (undated) DEVICE — LENS VITRTM FLT DISP

## (undated) DEVICE — SOL GLY 1.5 3000ML

## (undated) DEVICE — LOW TEMPERATURE CAUTERY: Brand: ACCU-TEMP®

## (undated) DEVICE — CATH SECURING DEVICE STATLOCK

## (undated) DEVICE — PILLOW FACE DOWN W/CASE SM

## (undated) DEVICE — EVAC URL LDSEN DF4 IBIR 64CM

## (undated) DEVICE — 6 ML SYRINGE LUER-LOCK TIP: Brand: MONOJECT

## (undated) DEVICE — SOL H2O 1000ML BTL

## (undated) DEVICE — PEN 6\" SURGICAL MARKING PURPL

## (undated) DEVICE — KENDALL SCD EXPRESS SLEEVES, KNEE LENGTH, MEDIUM: Brand: KENDALL SCD

## (undated) DEVICE — SATINSLIT® KNIFE 2.75MM ANGLED: Brand: SATINSLIT®

## (undated) DEVICE — SOL H2O 3000ML IRRIG

## (undated) DEVICE — 25+ TOTAL PLUS VITRECTORY PAK, BEVELED 10,000 CPM ULTRAVIT PROBE STRAIGHT ENDOILLUMINATOR: Brand: CONSTELLATION, EDGE PLUS, TOTAL PLUS, ULTRAVIT

## (undated) DEVICE — CYSTO PACK: Brand: MEDLINE INDUSTRIES, INC.

## (undated) DEVICE — BENTSON PTFE WIRE GUIDE WITH 15CM FLEXIBLE TIP: Brand: BENTSON

## (undated) DEVICE — T.U.R. ADD ON PACK: Brand: MEDLINE INDUSTRIES, INC.

## (undated) DEVICE — 25GA SOFT TIP CANNULA: Brand: SYNERGETICS

## (undated) DEVICE — CATH FOLEY COUNCIL 16FR 5CC

## (undated) DEVICE — BAG DRAIN INFECTION CNTRL 2000

## (undated) DEVICE — SOL IRRIG NACL 1000CC BTL .9%

## (undated) DEVICE — SUTURE ETHILON 10-0 9010G

## (undated) DEVICE — Device: Brand: JELCO

## (undated) DEVICE — GOWN SURG AERO BLUE PERF LG

## (undated) DEVICE — UROLOGY DRAIN BAG STERILE

## (undated) DEVICE — SOL  .9 3000ML

## (undated) DEVICE — SOL  .9 1000ML BTL

## (undated) DEVICE — FILTER FLUID EYE E4429-22

## (undated) DEVICE — HIGH TEMPERATURE CAUTERY: Brand: ACCU-TEMP®

## (undated) DEVICE — THE MONARCH® "B" CARTRIDGE IS A SINGLE-USE POLYPROPYLENE CARTRIDGE FOR POSTERIOR CHAMBER IOL DELIVERY: Brand: MONARCH® II

## (undated) NOTE — Clinical Note
4/6/2017          To Whom It May Concern:    Huy Bowman is currently under my medical care, Blair Gusman was at her father, Hong Poisson visit, on 4/6/17. If you require additional information please contact our office.         Alf García

## (undated) NOTE — MR AVS SNAPSHOT
Carie Brunson   2017 11:45 AM   Office Visit   MRN:  [id number]    Description:  Male : 3/29/1935   Provider:  Jorge Luis Moreau   Department:  [facility] AND [facility] Hematology Oncology              Visit Summary      Allergies     No Know Please keep your updated medication list with you to share with other healthcare providers. At [facility] we continue to have an Open Medical Record policy. We can provide you with your current medication list and lab results today.   I

## (undated) NOTE — ED AVS SNAPSHOT
[facility] in [facility].  [address] [facility]    Phone:  [phone]    Fax:  [phone]           Vazquez Marie   MRN: [id number]    Department:  [facility] in [address] [place] [address]   Date of V Discharge Instructions       As discussed, it is very important follow-up with the primary care doctor or urologist on Monday. The medications are being prescribed today, Levaquin, does interact with warfarin.   A repeat of your blood work specifically the You were examined and treated today on an urgent basis only. This was not a substitute for ongoing medical care. Often, one Immediate Care visit does not uncover every injury or illness.  If you have been referred to a primary care or a specialist physicia surjit. 24-Hour Pharmacies        Pharmacy Address Phone Number   [facility] [address] [facility]"" ([address]) [address] [facility]) [phone]   [facility] [employer]  ([address]

## (undated) NOTE — MR AVS SNAPSHOT
Arlet Mims   2017 1:00 PM   Office Visit   MRN:  [id number]    Description:  Male : 3/29/1935   Provider:  Elie Markham; Klaus Vega   Department:  [address] [place] [address]              Visit Summary      P an Open Medical Record policy. We can provide you with your current medication list and lab results today.   If you would like to review your medical record, we can assist you to set up an appointment with the appropriate medical professional to review you

## (undated) NOTE — MR AVS SNAPSHOT
[place]  [address]  [phone]               Thank you for choosing us for your health care visit with Kely Austin MD.  We are glad to serve you and happy to provide you with this summary may be held responsible for payment in full if proper authorization is not acquired. Please contact the Patient Business Office at [phone] if you have any questions related to insurance coverage. Thank you.        Thursday March 23, 2017     Imaging: 138/75 mmHg 80 98.3 °F (36.8 °C) (Oral) 5' 10\" (1.778 m) 190 lb (86.183 kg) 27.26 kg/m2         Current Medications          This list is accurate as of: 3/23/17 10:57 AM.  Always use your most recent med list.                aspirin EC 81 MG Tbec   Take Commonly known as:  PYRIDIUM           sucralfate 1 g Tabs   Take 1 g by mouth. Commonly known as:  CARAFATE           Warfarin Sodium 2.5 MG Tabs   Take 2.5 mg by mouth. Commonly known as:  COUMADIN           * Notice:   This list has 5 medication(s) t DASH eating plan Adopt a diet rich in fruits, vegetables, and low fat dairy products with reduced content of saturated and total fat.    Dietary sodium reduction Reduce dietary sodium intake to <= 100 mmol per day (2.4 g sodium or 6 g sodium chloride)   Aer

## (undated) NOTE — MR AVS SNAPSHOT
[facility]  [address]  [phone]               Thank you for choosing us for your health care visit with Jas Christiansen MD.  We are glad to serve you and happy to provide you with this summary * Insulin NPH (Human) (Isophane) 100 UNIT/ML Supn   Inject 3-5 Units into the skin. * Insulin Syringe 30G X 5/16\" 1 ML Misc   1 Syringe. * Insulin Syringe-Needle U-100 31G X 15/64\" 0.3 ML Misc   1 each.            * Insulin Syr Please consider the following Lifestyle Modifications. Also, please return for a follow-up Blood Pressure Check in 1 month.      Lifestyle Modification Recommendations:    Modification Recommendation   Weight Reduction Maintain normal body weight (body mas

## (undated) NOTE — Clinical Note
4/6/2017          To Whom It May Concern:    Matiasadánkristine Honduran is currently under my medical care. Mrs Sheeba Mullen was present at her , Evens Theodore, appointment on 4/6/17.      If you require additional information please contact

## (undated) NOTE — MR AVS SNAPSHOT
[facility]  [address]  [phone]               Thank you for choosing us for your health care visit with Martinez Fry MD.  We are glad to serve you and happy to provide you with this summary Commonly known as:  LIPITOR           bicalutamide 50 MG Tabs   Take 1 tablet (50 mg total) by mouth nightly. Commonly known as:  CASODEX           ClonazePAM 0.5 MG Tabs   Take 1 tablet by mouth nightly as needed.    Commonly known as:  Christy Benavidez medications prescribed for you. Read the directions carefully, and ask your doctor or other care provider to review them with you.             Results of Recent Testing     URINALYSIS, AUTO, W/O SCOPE      Component Value Standard Range & Units    GLUCOSE ( Don’t eat while distracted and slow down. Avoid over sized portions. Don’t eat while when you’re bored.      EAT THESE FOODS MORE OFTEN: EAT THESE FOODS LESS OFTEN:   Make half your plate fruits and vegetables Highly refined, white starches including wh

## (undated) NOTE — MR AVS SNAPSHOT
Jorge Luis Díaz   2017 9:45 AM   Office Visit   MRN:  [id number]    Description:  Male : 3/29/1935   Provider:  Darlin Crespo   Department:  [facility] AND [facility] Hematology Oncology              Visit Summary      Primary Visit Alyssa Anne Omeprazole 40 MG Oral Capsule Delayed Release Take 40 mg by mouth.    sucralfate 1 G Oral Tab Take 1 g by mouth. Warfarin Sodium 2.5 MG Oral Tab Take 2.5 mg by mouth.       Patient Instructions     None      Please Note     Please keep your updated medi

## (undated) NOTE — MR AVS SNAPSHOT
[place]  [address]  [phone]               Thank you for choosing us for your health care visit with Loly Hutchinson MD.  We are glad to serve you and happy to provide you with this summary Take 1 tablet (50 mg total) by mouth nightly. Commonly known as:  CASODEX           cefdinir 300 MG Caps   Take 1 capsule (300 mg total) by mouth every 12 (twelve) hours.  Start the day before the procedure   Commonly known as:  OMNICEF           Ciproflo Warfarin Sodium 2.5 MG Tabs   Take 2.5 mg by mouth. Commonly known as:  COUMADIN           * Notice: This list has 5 medication(s) that are the same as other medications prescribed for you.  Read the directions carefully, and ask your doctor or o Dietary sodium reduction Reduce dietary sodium intake to <= 100 mmol per day (2.4 g sodium or 6 g sodium chloride)   Aerobic physical activity Regular aerobic physical activity (e.g., brisk walking, light jogging, cycling, swimming, etc.) for a goal of at

## (undated) NOTE — Clinical Note
Hello! Pt and daughter inquiring about whether or not pt qualifies for any free home services (care givers, meals, etc). He lives with his wife. Pt gives permission for you to call his daughter Carrie Laws ([phone]) with this info.  Both pt and dtr

## (undated) NOTE — IP AVS SNAPSHOT
[facility]    [address], [place], [place] ~ [phone]                Discharge Summary   3/15/2017    Candy Reedt           Admission Information        Provider Department    3/15/2017 Megan Angela MD [facility] Commonly known as:  COUMADIN       * Notice: This list has 5 medication(s) that are the same as other medications prescribed for you. Read the directions carefully, and ask your doctor or other care provider to review them with you.          Where to Get Y cramps, gas pains or a bloated feeling in your abdomen. · To experience mild back pain or soreness for a day or two if you had spinal or epidural anesthesia. · If you had laparoscopic surgery, to feel shoulder pain or discomfort on the day of surgery. ___________________________________________________________________________________________  After the procedure  If you had a sedative, general anesthesia, or spinal anesthesia, you must have someone drive you home.  Once you’re home:  · Drink plenty of fl Randy 112.         Visit Information        Department Dept Phone    3/15/2017  8:52 AM [facility] [phone]         We want to hear from you       We want to hear from you, please share your experience with us by returning the survey you will